# Patient Record
Sex: MALE | Race: WHITE | Employment: UNEMPLOYED | URBAN - METROPOLITAN AREA
[De-identification: names, ages, dates, MRNs, and addresses within clinical notes are randomized per-mention and may not be internally consistent; named-entity substitution may affect disease eponyms.]

---

## 2017-01-17 ENCOUNTER — OFFICE VISIT (OUTPATIENT)
Dept: PEDIATRICS | Facility: CLINIC | Age: 32
End: 2017-01-17
Payer: COMMERCIAL

## 2017-01-17 VITALS
RESPIRATION RATE: 20 BRPM | HEART RATE: 72 BPM | HEIGHT: 62 IN | SYSTOLIC BLOOD PRESSURE: 98 MMHG | TEMPERATURE: 97.3 F | DIASTOLIC BLOOD PRESSURE: 64 MMHG | WEIGHT: 108 LBS | BODY MASS INDEX: 19.88 KG/M2

## 2017-01-17 DIAGNOSIS — Q90.9 TRISOMY 21: ICD-10-CM

## 2017-01-17 DIAGNOSIS — K21.9 GASTROESOPHAGEAL REFLUX DISEASE WITHOUT ESOPHAGITIS: ICD-10-CM

## 2017-01-17 DIAGNOSIS — Z00.00 ROUTINE GENERAL MEDICAL EXAMINATION AT A HEALTH CARE FACILITY: Primary | ICD-10-CM

## 2017-01-17 DIAGNOSIS — N39.41 URGE INCONTINENCE OF URINE: ICD-10-CM

## 2017-01-17 DIAGNOSIS — K90.0 CELIAC DISEASE: ICD-10-CM

## 2017-01-17 LAB — HBA1C MFR BLD: 4.9 % (ref 4.3–6)

## 2017-01-17 PROCEDURE — 99395 PREV VISIT EST AGE 18-39: CPT | Performed by: INTERNAL MEDICINE

## 2017-01-17 PROCEDURE — 83036 HEMOGLOBIN GLYCOSYLATED A1C: CPT | Performed by: INTERNAL MEDICINE

## 2017-01-17 PROCEDURE — 36415 COLL VENOUS BLD VENIPUNCTURE: CPT | Performed by: INTERNAL MEDICINE

## 2017-01-17 NOTE — PROGRESS NOTES
SUBJECTIVE:     CC: Baljinder Denise is an 31 year old male who presents for preventative health visit.     Healthy Habits:    Do you get at least three servings of calcium containing foods daily (dairy, green leafy vegetables, etc.)? yes    Amount of exercise or daily activities, outside of work: once day(s) per week    Problems taking medications regularly not applicable    Medication side effects: Not applicable    Have you had an eye exam in the past two years? yes    Do you see a dentist twice per year? no    Do you have sleep apnea, excessive snoring or daytime drowsiness?no    Mild cough and body acne. OTC scrubs have not been effetive    Cough is fairly chronic.  Sporadic.   Unclear if related to JUSTO.    Has trisomy 21 and cognitive difficulties. Lives w/ his parents who help with his cares.    Celiac disease. No active GI sx. Following gluten-free diet.    Has occasional urinary incontinence. Mostly urge. No prior hx of DM.     Today's PHQ-2 Score: 0  PHQ-2 ( 1999 Pf[employer]) 1/17/2017 4/30/2014   Q1: Little interest or pleasure in doing things 0 0   Q2: Feeling down, depressed or hopeless 0 0   PHQ-2 Score 0 0       Abuse: Current or Past(Physical, Sexual or Emotional)- No  Do you feel safe in your environment - Yes    Social History   Substance Use Topics     Smoking status: Never Smoker      Smokeless tobacco: Never Used     Alcohol Use: No     The patient does not drink >3 drinks per day nor >7 drinks per week.    Reviewed orders with patient. Reviewed health maintenance and updated orders accordingly - Yes    All Histories reviewed and updated in Epic.    ROS:  C: NEGATIVE for fever, chills, change in weight  I: NEGATIVE for worrisome rashes, moles or lesions  E: NEGATIVE for vision changes or irritation  ENT: NEGATIVE for ear, mouth and throat problems  R: NEGATIVE for significant cough or SOB  CV: NEGATIVE for chest pain, palpitations or peripheral edema  GI: NEGATIVE for nausea, abdominal pain,  "heartburn, or change in bowel habits  : Per HPI   M: NEGATIVE for significant arthralgias or myalgia  N: NEGATIVE for weakness, dizziness or paresthesias  PSYCHIATRIC: Per HPI     Problem list, Medication list, Allergies, and Medical/Social/Surgical histories reviewed in [facility] and updated as appropriate.  Labs reviewed in EPIC  OBJECTIVE:     BP 98/64 mmHg  Pulse 72  Temp(Src) 97.3  F (36.3  C) (Axillary)  Resp 20  Ht 5' 2\" (1.575 m)  Wt 108 lb (48.988 kg)  BMI 19.75 kg/m2  EXAM:  GEN: interactive, no distress  SKIN: scattered acne lesions on the chest and upper back. Few on the face.  HEENT: PERRL. EOMI. TM's clear bilaterally. Nasal mucosa normal. OP moist without lesions.  NECK: Supple. No LAD, JVD or TM.  LUNGS: Clear to auscultation bilaterally. No rhonchi, rales, wheezes or retractions.  CV: Regular rate and rhythm.  No murmurs, rubs or gallops. Pulses 2+ radial.  ABD: Bowel sounds positive throughout. Soft, nontender, nondistended. No organomegaly. No masses.  EXTR: no LE edema  NEURO: Alert, interactive. CN 2-12 grossly intact. No focal motor deficits. Deficits w/ coordination noted.  PSYCH: Normal affect. Well groomed. Fair eye contact.     ASSESSMENT/PLAN:         ICD-10-CM    1. Routine general medical examination at a health care facility Z00.00    2. Trisomy 21 Q90.9    3. Celiac disease K90.0 Hemoglobin A1c   4. Gastroesophageal reflux disease without esophagitis K21.9    5. Urge incontinence of urine N39.41 Hemoglobin A1c         COUNSELING:  Reviewed preventive health counseling, as reflected in patient instructions         reports that he has never smoked. He has never used smokeless tobacco.    Estimated body mass index is 21.05 kg/(m^2) as calculated from the following:    Height as of 6/16/14: 5' 2.5\" (1.588 m).    Weight as of 6/16/14: 117 lb (53.071 kg).       Surya Javier MD  [facility]  "

## 2017-01-17 NOTE — PATIENT INSTRUCTIONS
For Faheem's cough, try giving Prevacid daily for the next few weeks    If his cough does improve, you can continue    Hibiclens with bathing for the next week   See if this reduces acne   If it does, you can use the Hibiclens periodically      Preventive Health Recommendations    Yearly exam:             See your health care provider every year in order to  o   Review health changes.   o   Discuss preventive care.      Shots: Get a flu shot each year. Get a tetanus shot every 10 years.     Nutrition:    Eat at least 5 servings of fruits and vegetables daily.     Eat whole-grain bread, whole-wheat pasta and brown rice instead of white grains and rice.     Get adequate Calcium and Vitamin D.     Lifestyle    Exercise for at least 150 minutes a week (30 minutes a day, 5 days a week). This will help you control your weight and prevent disease.     Limit alcohol to one drink per day.     No smoking.     Wear sunscreen to prevent skin cancer.     See your dentist every six months for an exam and cleaning.

## 2017-01-17 NOTE — MR AVS SNAPSHOT
After Visit Summary   1/17/2017    Baljinder Denise    MRN: [id number]           Patient Information     Date Of Birth          1985        Visit Information        Provider Department      1/17/2017 10:20 AM Surya Javier MD [facility]        Today's Diagnoses     Routine general medical examination at a health care facility    -  1     Trisomy 21         Celiac disease         Gastroesophageal reflux disease without esophagitis         Urge incontinence of urine           Care Instructions    For Faheem's cough, try giving Prevacid daily for the next few weeks    If his cough does improve, you can continue    Hibiclens with bathing for the next week   See if this reduces acne   If it does, you can use the Hibiclens periodically      Preventive Health Recommendations    Yearly exam:             See your health care provider every year in order to  o   Review health changes.   o   Discuss preventive care.      Shots: Get a flu shot each year. Get a tetanus shot every 10 years.     Nutrition:    Eat at least 5 servings of fruits and vegetables daily.     Eat whole-grain bread, whole-wheat pasta and brown rice instead of white grains and rice.     Get adequate Calcium and Vitamin D.     Lifestyle    Exercise for at least 150 minutes a week (30 minutes a day, 5 days a week). This will help you control your weight and prevent disease.     Limit alcohol to one drink per day.     No smoking.     Wear sunscreen to prevent skin cancer.     See your dentist every six months for an exam and cleaning.         Follow-ups after your visit        Who to contact     If you have questions or need follow up information about today's clinic visit or your schedule please contact [facility] directly at [phone].  Normal or non-critical lab and imaging results will be communicated to you by MyChart, letter or phone within 4 business days after the clinic has received the results. If you do  "not hear from us within 7 days, please contact the clinic through [employer] or phone. If you have a critical or abnormal lab result, we will notify you by phone as soon as possible.  Submit refill requests through [employer] or call your pharmacy and they will forward the refill request to us. Please allow 3 business days for your refill to be completed.          Additional Information About Your Visit        [employer] Information     [employer] gives you secure access to your electronic health record. If you see a primary care provider, you can also send messages to your care team and make appointments. If you have questions, please call your primary care clinic.  If you do not have a primary care provider, please call [phone] and they will assist you.        Care EveryWhere ID     This is your Care EveryWhere ID. This could be used by other organizations to access your [facility] medical records  [id number]        Your Vitals Were     Pulse Temperature Respirations Height BMI (Body Mass Index)       72 97.3  F (36.3  C) (Axillary) 20 5' 2\" (1.575 m) 19.75 kg/m2        Blood Pressure from Last 3 Encounters:   01/17/17 98/64   06/16/14 98/56   05/27/14 104/63    Weight from Last 3 Encounters:   01/17/17 108 lb (48.988 kg)   06/16/14 117 lb (53.071 kg)   05/27/14 119 lb (53.978 kg)              We Performed the Following     Hemoglobin A1c          Today's Medication Changes          These changes are accurate as of: 1/17/17 11:15 AM.  If you have any questions, ask your nurse or doctor.               Stop taking these medicines if you haven't already. Please contact your care team if you have questions.     PREVACID PO   Stopped by:  Surya Javier MD                    Primary Care Provider Office Phone # Fax #    Surya Javier [phone]       [facility] [address] [place] [address]        Thank you!     Thank you for choosing [facility]  for your care. Our goal is always " to provide you with excellent care. Hearing back from our patients is one way we can continue to improve our services. Please take a few minutes to complete the written survey that you may receive in the mail after your visit with us. Thank you!             Your Updated Medication List - Protect others around you: Learn how to safely use, store and throw away your medicines at www.disposemymeds.org.          This list is accurate as of: 1/17/17 11:15 AM.  Always use your most recent med list.                   Brand Name Dispense Instructions for use    UNKNOWN TO PATIENT      Anti-diarrhea

## 2017-01-17 NOTE — NURSING NOTE
"Chief Complaint   Patient presents with     Physical       Initial BP 98/64 mmHg  Pulse 72  Temp(Src) 97.3  F (36.3  C) (Axillary)  Resp 20  Ht 5' 2\" (1.575 m)  Wt 108 lb (48.988 kg)  BMI 19.75 kg/m2 Estimated body mass index is 19.75 kg/(m^2) as calculated from the following:    Height as of this encounter: 5' 2\" (1.575 m).    Weight as of this encounter: 108 lb (48.988 kg).  BP completed using cuff size: regular Rt arm  Asiya ACEVES, ANA MARÍA,AAMA      "

## 2018-02-02 ENCOUNTER — TELEPHONE (OUTPATIENT)
Dept: PEDIATRICS | Facility: CLINIC | Age: 33
End: 2018-02-02

## 2018-02-02 NOTE — TELEPHONE ENCOUNTER
Reason for Call:  Form, our goal is to have forms completed with 72 hours, however, some forms may require a visit or additional information.    Type of letter, form or note:  medical    Who is the form from?: Patient    Where did the form come from: Patient or family brought in       What clinic location was the form placed at?: [facility]    Where the form was placed: 's Box    What number is listed as a contact on the form?: [phone]       Additional comments: Please call Nanci (mom) to  [phone]    Call taken on 2/2/2018 at 10:07 AM by Dannie Borges

## 2018-04-30 ENCOUNTER — OFFICE VISIT (OUTPATIENT)
Dept: PEDIATRICS | Facility: CLINIC | Age: 33
End: 2018-04-30
Payer: COMMERCIAL

## 2018-04-30 ENCOUNTER — RADIANT APPOINTMENT (OUTPATIENT)
Dept: GENERAL RADIOLOGY | Facility: CLINIC | Age: 33
End: 2018-04-30
Attending: INTERNAL MEDICINE
Payer: COMMERCIAL

## 2018-04-30 VITALS
WEIGHT: 106.1 LBS | RESPIRATION RATE: 14 BRPM | SYSTOLIC BLOOD PRESSURE: 98 MMHG | OXYGEN SATURATION: 98 % | TEMPERATURE: 97.9 F | BODY MASS INDEX: 19.41 KG/M2 | HEART RATE: 86 BPM | DIASTOLIC BLOOD PRESSURE: 68 MMHG

## 2018-04-30 DIAGNOSIS — Q90.9 TRISOMY 21: ICD-10-CM

## 2018-04-30 DIAGNOSIS — R15.9 FULL INCONTINENCE OF FECES: Primary | ICD-10-CM

## 2018-04-30 DIAGNOSIS — Z13.220 LIPID SCREENING: ICD-10-CM

## 2018-04-30 DIAGNOSIS — R15.9 FULL INCONTINENCE OF FECES: ICD-10-CM

## 2018-04-30 DIAGNOSIS — Z13.1 SCREENING FOR DIABETES MELLITUS: ICD-10-CM

## 2018-04-30 PROCEDURE — 99213 OFFICE O/P EST LOW 20 MIN: CPT | Performed by: INTERNAL MEDICINE

## 2018-04-30 PROCEDURE — 74019 RADEX ABDOMEN 2 VIEWS: CPT

## 2018-04-30 RX ORDER — PEDIATRIC MULTIVITAMIN NO.17
TABLET,CHEWABLE ORAL
COMMUNITY

## 2018-04-30 RX ORDER — ASCORBIC ACID 250 MG
250 TABLET,CHEWABLE ORAL DAILY
COMMUNITY

## 2018-04-30 NOTE — PATIENT INSTRUCTIONS
Add a fiber supplement once per day    If this does not help, call to set up a gastroenterology visit    [facility] [phone]

## 2018-04-30 NOTE — PROGRESS NOTES
SUBJECTIVE:   Baljinder Denise is a 32 year old male who presents to clinic today for the following health issues:    Incontinence symptoms    Duration: 4 months, initially was occasionally, significant increase in the past 2 weeks     Description:  Incontinence - has been happening more often lately    Accompanying signs and symptoms (fever/nausea/vomiting/back or abdominal pain):  None    History : Hx of constipation when young, typically has a stool once per day. No toilet clogging. No blood or mucus.      Precipitating or alleviating factors: None    Therapies tried and outcome: antidiarrhea pills, more restricted diet    No infectious sx.  Is on a restricted diet - Gluten-free d/t Celiac disease, no lactose, limited nitrates.      Problem list and histories reviewed & adjusted, as indicated.    Labs reviewed in EPIC    Reviewed and updated as needed this visit by Provider  Tobacco  Allergies  Meds  Problems  Med Hx  Surg Hx  Fam Hx  Soc Hx              OBJECTIVE:     BP 98/68 (BP Location: Right arm, Patient Position: Chair, Cuff Size: Adult Regular)  Pulse 86  Temp 97.9  F (36.6  C) (Tympanic)  Resp 14  Wt 106 lb 1.6 oz (48.1 kg)  SpO2 98%  BMI 19.41 kg/m2  Body mass index is 19.41 kg/(m^2).  GEN: No distress  SKIN: No rashes  LUNGS: Clear to auscultation bilaterally. No rhonchi, rales, wheezes or retractions.  CV: Regular rate and rhythm.  No murmurs, rubs or gallops. Pulses 2+ radial.  ABD: Bowel sounds positive throughout. Soft, nontender, nondistended. No organomegaly. No masses.   EXTR: no edema    Recent Results (from the past 744 hour(s))   XR Abdomen 2 Views    Narrative    ABDOMEN TWO-THREE VIEW April 30, 2018 9:31 AM     HISTORY: Full incontinence of feces.    COMPARISON: April 30, 2014.    FINDINGS: Moderate  amount of stool. No free air. There are no air  filled distended loops of small bowel. The colon is not distended. The  lung bases are unremarkable.      Impression     IMPRESSION: Nonobstructed bowel gas pattern.    YULIET CRONIN MD          ASSESSMENT/PLAN:       ICD-10-CM    1. Full incontinence of feces R15.9 XR Abdomen 2 Views      Patient Instructions   Add a fiber supplement once per day    If this does not help, call to set up a gastroenterology visit    [facility] [phone]       Surya Javier MD  [facility]

## 2018-04-30 NOTE — MR AVS SNAPSHOT
After Visit Summary   4/30/2018    Baljinder Denise    MRN: [id number]           Patient Information     Date Of Birth          1985        Visit Information        Provider Department      4/30/2018 8:50 AM [facility]        Today's Diagnoses     Full incontinence of feces    -  1      Care Instructions    Add a fiber supplement once per day    If this does not help, call to set up a gastroenterology visit    [facility] [phone]          Follow-ups after your visit        Additional Services     GASTROENTEROLOGY ADULT REF CONSULT ONLY       Preferred Location: [facility] [phone]      Please be aware that coverage of these services is subject to the terms and limitations of your health insurance plan.  Call member services at your health plan with any benefit or coverage questions.  Any procedures must be performed at a [place] facility OR coordinated by your clinic's referral office.    Please bring the following with you to your appointment:    (1) Any X-Rays, CTs or MRIs which have been performed.  Contact the facility where they were done to arrange for  prior to your scheduled appointment.    (2) List of current medications   (3) This referral request   (4) Any documents/labs given to you for this referral                  Who to contact     If you have questions or need follow up information about today's clinic visit or your schedule please contact [facility] at [phone].  Normal or non-critical lab and imaging results will be communicated to you by MyChart, letter or phone within 4 business days after the clinic has received the results. If you do not hear from us within 7 days, please contact the clinic through MyChart or phone. If you have a critical or abnormal lab result, we will notify you by phone as soon as possible.  Submit refill requests through [employer] or call your pharmacy and they will  forward the refill request to us. Please allow 3 business days for your refill to be completed.          Additional Information About Your Visit        [employer] Information     [employer] gives you secure access to your electronic health record. If you see a primary care provider, you can also send messages to your care team and make appointments. If you have questions, please call your primary care clinic.  If you do not have a primary care provider, please call [phone] and they will assist you.        Care EveryWhere ID     This is your Care EveryWhere ID. This could be used by other organizations to access your [facility] medical records  [id number]        Your Vitals Were     Pulse Temperature Respirations Pulse Oximetry BMI (Body Mass Index)       86 97.9  F (36.6  C) (Tympanic) 14 98% 19.41 kg/m2        Blood Pressure from Last 3 Encounters:   04/30/18 98/68   01/17/17 98/64   06/16/14 98/56    Weight from Last 3 Encounters:   04/30/18 106 lb 1.6 oz (48.1 kg)   01/17/17 108 lb (49 kg)   06/16/14 117 lb (53.1 kg)              We Performed the Following     GASTROENTEROLOGY ADULT REF CONSULT ONLY        Primary Care Provider Office Phone # Fax #    Surya Javier [phone] [address] [place] [address]        Equal Access to Services     [facility]: Hadii aad ku hadasho Soomaali, waaxda luqadaha, qaybta kaalmada adeegyada, ava alex haycooper bhat . So [facility] [phone].    ATENCIÓN: Si habla español, tiene a rivera disposición servicios gratuitos de asistencia lingüística. Llame al [phone].    We comply with applicable federal civil rights laws and [place] laws. We do not discriminate on the basis of race, color, national origin, age, disability, sex, sexual orientation, or gender identity.            Thank you!     Thank you for choosing [facility]  for your care. Our goal is always to provide you with excellent care. Hearing back from  our patients is one way we can continue to improve our services. Please take a few minutes to complete the written survey that you may receive in the mail after your visit with us. Thank you!             Your Updated Medication List - Protect others around you: Learn how to safely use, store and throw away your medicines at www.disposemymeds.org.          This list is accurate as of 4/30/18  9:31 AM.  Always use your most recent med list.                   Brand Name Dispense Instructions for use Diagnosis    ALLEGRA PO      Take 30 mg by mouth        ascorbic acid 250 MG Chew chewable tablet    vitamin C     Take 250 mg by mouth daily        MULTIVITAMIN CHILDRENS Chew           UNKNOWN TO PATIENT      Anti-diarrhea

## 2018-05-22 ENCOUNTER — TRANSFERRED RECORDS (OUTPATIENT)
Dept: HEALTH INFORMATION MANAGEMENT | Facility: CLINIC | Age: 33
End: 2018-05-22

## 2018-05-23 ENCOUNTER — MEDICAL CORRESPONDENCE (OUTPATIENT)
Dept: HEALTH INFORMATION MANAGEMENT | Facility: CLINIC | Age: 33
End: 2018-05-23

## 2018-05-23 ENCOUNTER — TRANSFERRED RECORDS (OUTPATIENT)
Dept: HEALTH INFORMATION MANAGEMENT | Facility: CLINIC | Age: 33
End: 2018-05-23

## 2018-05-30 DIAGNOSIS — K59.00 CONSTIPATION: ICD-10-CM

## 2018-05-30 PROCEDURE — 74019 RADEX ABDOMEN 2 VIEWS: CPT

## 2018-06-08 ENCOUNTER — TRANSFERRED RECORDS (OUTPATIENT)
Dept: HEALTH INFORMATION MANAGEMENT | Facility: CLINIC | Age: 33
End: 2018-06-08

## 2019-01-18 ENCOUNTER — TELEPHONE (OUTPATIENT)
Dept: PEDIATRICS | Facility: CLINIC | Age: 34
End: 2019-01-18

## 2019-01-18 NOTE — TELEPHONE ENCOUNTER
Reason for Call:  Form, our goal is to have forms completed with 72 hours, however, some forms may require a visit or additional information.    Type of letter, form or note:  disability    Who is the form from?: Patient    Where did the form come from: Patient or family brought in       What clinic location was the form placed at?: [facility]    Where the form was placed: 's Box    What number is listed as a contact on the form?: [phone]       Additional comments: please mail enclosed stamped envelope     Call taken on 1/18/2019 at 4:57 PM by Ursula Dawson

## 2019-01-21 NOTE — TELEPHONE ENCOUNTER
Placed in mail. LM on VM informing Pt that papers was completed and mailed.   A copy placed in providers fax bin for future if needed.  Asiya ACEVES, ANA MARÍA,YONY

## 2020-01-10 ENCOUNTER — PRE VISIT (OUTPATIENT)
Dept: PEDIATRICS | Facility: CLINIC | Age: 35
End: 2020-01-10

## 2020-01-10 NOTE — TELEPHONE ENCOUNTER
Pre-Visit Planning     Future Appointments   Date Time Provider Department Center   1/14/2020 10:20 AM Surya Javier MD [facility]     Arrival Time for this Appointment:    Appointment Notes for this encounter:   Data Unavailable    Questionnaires Reviewed/Assigned  No additional questionnaires are needed        Patient preferred phone number: [phone]    Unable to reach patient and unable to leave voicemail.     Attempted to reach patient on home and cell phone for pre-visit planning.  The voicemail on home and cell phone did not match the patient's name, therefore a message was not left on either voicemail.  When patient arrives for appointment, requested confirmation of demographic information.

## 2020-01-13 NOTE — PATIENT INSTRUCTIONS
For loose stools, things to try:   - Increase fiber (through diet or with a supplement)   - LactAid (see if lactose intolerance may cause loose stools)    For acne:   - Try Hibiclens (chlorhexidine) wash. Use once per week for 1-2 months      Preventive Health Recommendations    Yearly exam:             See your health care provider every year in order to  o   Review health changes.   o   Discuss preventive care.      Shots: Get a flu shot each year. Get a tetanus shot every 10 years.     Nutrition:    Eat at least 5 servings of fruits and vegetables daily.     Eat whole-grain bread, whole-wheat pasta and brown rice instead of white grains and rice.     Get adequate Calcium and Vitamin D.     Lifestyle    Exercise for at least 150 minutes a week (30 minutes a day, 5 days a week). This will help you control your weight and prevent disease.     Limit alcohol to one drink per day.     No smoking.     Wear sunscreen to prevent skin cancer.     See your dentist every six months for an exam and cleaning.

## 2020-01-13 NOTE — PROGRESS NOTES
SUBJECTIVE:   CC: Baljinder Denise is an 34 year old male who presents for preventative health visit.     Healthy Habits:     Getting at least 3 servings of Calcium per day:  Yes    Bi-annual eye exam:  Yes    Dental care twice a year:  Yes    Sleep apnea or symptoms of sleep apnea:  None    Diet:  Gluten-free/reduced    Frequency of exercise:  2-3 days/week    Duration of exercise:  15-30 minutes    Taking medications regularly:  Yes    Barriers to taking medications:  None    Medication side effects:  None    PHQ-2 Total Score: 0    Additional concerns today:  No    No acute concerns today.    Has been having intermittent loose stools. Will occur one time, about 1-2 times per month.  Hx of Celiac, follows a gluten free diet.  He does not consume much dairy. Unclear if his sx could be related to dairy intake. Discussed options.    Acne. Noted mainly in the buttock area. Intermittent. Discussed options.     Today's PHQ-2 Score:   PHQ-2 ( 1999 Pfizer) 1/14/2020   Q1: Little interest or pleasure in doing things 0   Q2: Feeling down, depressed or hopeless 0   PHQ-2 Score 0   Q1: Little interest or pleasure in doing things Not at all   Q2: Feeling down, depressed or hopeless Not at all   PHQ-2 Score 0       Abuse: Current or Past(Physical, Sexual or Emotional)- No  Do you feel safe in your environment? Yes      Social History     Tobacco Use     Smoking status: Never Smoker     Smokeless tobacco: Never Used   Substance Use Topics     Alcohol use: No     If you drink alcohol do you typically have >3 drinks per day or >7 drinks per week? No    Alcohol Use 1/14/2020   Prescreen: >3 drinks/day or >7 drinks/week? Not Applicable   Prescreen: >3 drinks/day or >7 drinks/week? -       Last PSA: No results found for: PSA    Reviewed orders with patient. Reviewed health maintenance and updated orders accordingly - Yes    Reviewed and updated as needed this visit by Provider  Tobacco  Allergies  Meds  Problems  Med Hx  Surg  "Hx  Fam Hx            Review of Systems   Constitutional: Negative for chills and fever.   HENT: Negative for congestion, ear pain, hearing loss and sore throat.    Eyes: Negative for pain and visual disturbance.   Respiratory: Negative for cough and shortness of breath.    Cardiovascular: Negative for chest pain, palpitations and peripheral edema.   Gastrointestinal: Negative for abdominal pain, constipation, diarrhea, heartburn, hematochezia and nausea.   Genitourinary: Negative for discharge, dysuria, frequency, genital sores, hematuria, impotence and urgency.   Musculoskeletal: Negative for arthralgias, joint swelling and myalgias.   Skin: Negative for rash.   Neurological: Negative for dizziness, weakness, headaches and paresthesias.   Psychiatric/Behavioral: Negative for mood changes. The patient is not nervous/anxious.        OBJECTIVE:   /56 (BP Location: Right arm, Patient Position: Sitting, Cuff Size: Adult Regular)   Pulse 76   Temp 98.1  F (36.7  C) (Tympanic)   Resp 16   Ht 1.579 m (5' 2.17\")   Wt 48.1 kg (106 lb)   SpO2 99%   BMI 19.28 kg/m      Physical Exam  GENERAL: healthy, alert and no distress  EYES: Eyes grossly normal to inspection, PERRL and conjunctivae and sclerae normal  HENT: ear canals and TM's normal, nose and mouth without ulcers or lesions  NECK: no adenopathy, no asymmetry, masses, or scars and thyroid normal to palpation  RESP: lungs clear to auscultation - no rales, rhonchi or wheezes  CV: regular rate and rhythm, normal S1 S2, no S3 or S4, no murmur, click or rub, no peripheral edema and peripheral pulses strong  ABDOMEN: soft, nontender, no hepatosplenomegaly, no masses and bowel sounds normal  MS: no gross musculoskeletal defects noted, no edema  SKIN: no suspicious lesions or rashes  NEURO: Normal strength and tone, mentation intact and speech normal  PSYCH: mentation appears normal, affect normal/bright      ASSESSMENT/PLAN:       ICD-10-CM    1. Routine general " "medical examination at a health care facility Z00.00 Glucose     TSH with free T4 reflex     Lipid Profile   2. Trisomy 21 Q90.9 TSH with free T4 reflex   3. Celiac disease K90.0    4. Acne, unspecified acne type L70.9        COUNSELING:   Reviewed preventive health counseling, as reflected in patient instructions    Estimated body mass index is 19.28 kg/m  as calculated from the following:    Height as of this encounter: 1.579 m (5' 2.17\").    Weight as of this encounter: 48.1 kg (106 lb).          reports that he has never smoked. He has never used smokeless tobacco.      Counseling Resources:  ATP IV Guidelines  Pooled Cohorts Equation Calculator  FRAX Risk Assessment  ICSI Preventive Guidelines  Dietary Guidelines for Americans, 2010  USDA's MyPlate  ASA Prophylaxis  Lung CA Screening    Surya Javier MD  [facility] [place]  "

## 2020-01-14 ENCOUNTER — OFFICE VISIT (OUTPATIENT)
Dept: PEDIATRICS | Facility: CLINIC | Age: 35
End: 2020-01-14
Payer: COMMERCIAL

## 2020-01-14 VITALS
WEIGHT: 106 LBS | SYSTOLIC BLOOD PRESSURE: 100 MMHG | RESPIRATION RATE: 16 BRPM | TEMPERATURE: 98.1 F | DIASTOLIC BLOOD PRESSURE: 56 MMHG | OXYGEN SATURATION: 99 % | HEART RATE: 76 BPM | BODY MASS INDEX: 19.51 KG/M2 | HEIGHT: 62 IN

## 2020-01-14 DIAGNOSIS — Q90.9 TRISOMY 21: ICD-10-CM

## 2020-01-14 DIAGNOSIS — K90.0 CELIAC DISEASE: ICD-10-CM

## 2020-01-14 DIAGNOSIS — Z00.00 ROUTINE GENERAL MEDICAL EXAMINATION AT A HEALTH CARE FACILITY: Primary | ICD-10-CM

## 2020-01-14 DIAGNOSIS — L70.9 ACNE, UNSPECIFIED ACNE TYPE: ICD-10-CM

## 2020-01-14 PROCEDURE — 99395 PREV VISIT EST AGE 18-39: CPT | Performed by: INTERNAL MEDICINE

## 2020-01-14 ASSESSMENT — ENCOUNTER SYMPTOMS
FEVER: 0
HEARTBURN: 0
DIARRHEA: 0
SHORTNESS OF BREATH: 0
HEADACHES: 0
COUGH: 0
JOINT SWELLING: 0
CONSTIPATION: 0
EYE PAIN: 0
DIZZINESS: 0
DYSURIA: 0
SORE THROAT: 0
HEMATURIA: 0
NAUSEA: 0
PALPITATIONS: 0
HEMATOCHEZIA: 0
ARTHRALGIAS: 0
FREQUENCY: 0
MYALGIAS: 0
CHILLS: 0
NERVOUS/ANXIOUS: 0
PARESTHESIAS: 0
ABDOMINAL PAIN: 0
WEAKNESS: 0

## 2020-01-14 ASSESSMENT — MIFFLIN-ST. JEOR: SCORE: 1312.68

## 2020-03-01 ENCOUNTER — HEALTH MAINTENANCE LETTER (OUTPATIENT)
Age: 35
End: 2020-03-01

## 2020-10-25 ENCOUNTER — OFFICE VISIT (OUTPATIENT)
Dept: URGENT CARE | Facility: URGENT CARE | Age: 35
End: 2020-10-25
Payer: COMMERCIAL

## 2020-10-25 VITALS
HEART RATE: 67 BPM | OXYGEN SATURATION: 96 % | DIASTOLIC BLOOD PRESSURE: 78 MMHG | SYSTOLIC BLOOD PRESSURE: 102 MMHG | TEMPERATURE: 96.8 F

## 2020-10-25 DIAGNOSIS — L03.221 CELLULITIS OF NECK: ICD-10-CM

## 2020-10-25 DIAGNOSIS — L40.9 SCALP PSORIASIS: Primary | ICD-10-CM

## 2020-10-25 PROCEDURE — 99214 OFFICE O/P EST MOD 30 MIN: CPT | Performed by: FAMILY MEDICINE

## 2020-10-25 RX ORDER — CEPHALEXIN 500 MG/1
500 CAPSULE ORAL 3 TIMES DAILY
Qty: 21 CAPSULE | Refills: 0 | Status: SHIPPED | OUTPATIENT
Start: 2020-10-25 | End: 2020-11-01

## 2020-10-25 RX ORDER — CLOBETASOL PROPIONATE 0.5 MG/G
OINTMENT TOPICAL 2 TIMES DAILY
Qty: 120 G | Refills: 4 | Status: SHIPPED | OUTPATIENT
Start: 2020-10-25 | End: 2022-12-14

## 2020-10-25 NOTE — PROGRESS NOTES
SUBJECTIVE:   Baljinder Denise is a 35 year old male who is here with his mother.  He is presenting with a chief complaint of a severe flare-up of scalp psoriasis (diagnosed by his mother) with scaling, dryness, redness, itching at the posterior and superior areas of the scalp for the past 3-4 weeks.  .  In addition, patient has also noticed a new rash on the posterior, lateral and anterio neck  since two days ago.  (This new rash has been itchy). His older sister is concerned about a possible skin infection due to the patient's frequent scratching of the neck and scalp.       There has been bleeding and cracks on the scalp since a few days ago.      Patient has had dandruff shampoos placed and Nizoral shampoo over the scalp without any improvement.  In addition, coconut oil has been applied.    .       No new shampoos/clothes/detergents.      Past Medical History:   Diagnosis Date     Gastro-oesophageal reflux disease      Pneumonia 11/89    Hospitalized   Scalp Psoriasis  Celiac Disease  Trisomy 21    Current Outpatient Medications   Medication Sig Dispense Refill     ascorbic acid (VITAMIN C) 250 MG CHEW chewable tablet Take 250 mg by mouth daily       Fexofenadine HCl (ALLEGRA PO) Take 30 mg by mouth       Pediatric Multiple Vit-C-FA (MULTIVITAMIN CHILDRENS) CHEW        UNKNOWN TO PATIENT Anti-diarrhea       Social History     Tobacco Use     Smoking status: Never Smoker     Smokeless tobacco: Never Used   Substance Use Topics     Alcohol use: No       ROS:  CONSTITUTIONAL:NEGATIVE  for fevers.   INTEGUMENTARY/SKIN:  Positive for scalp psoriasis.      OBJECTIVE:  /78   Pulse 67   Temp 96.8  F (36  C) (Tympanic)   SpO2 96%   GENERAL APPEARANCE: healthy, alert and no distress  SKIN: posterior and posterior-superior aspects of the scalp have confluent erythema, extensive white hyperkeratotic scaling with excoriations.  The right posterior-lateral neck has skin excoriations, confluent erythema, edema.       ASSESSMENT:  Scalp Psoriasis  Cellulitis of the neck.       PLAN:  For the Scalp Psoriasis: Rx:  Clobetasol 0.05% ointment BID.  Do not exceed 14 days of use.    Patient has a scheduled appointment with his primary care provider in 2 weeks.      For the cellulitis of the neck:  Rx:  Cephalexin.  Go to the emergency room if fevers/increased pain appears.     Edmund Moreno MD

## 2020-11-02 ENCOUNTER — OFFICE VISIT (OUTPATIENT)
Dept: PEDIATRICS | Facility: CLINIC | Age: 35
End: 2020-11-02
Payer: COMMERCIAL

## 2020-11-02 VITALS
RESPIRATION RATE: 16 BRPM | DIASTOLIC BLOOD PRESSURE: 60 MMHG | HEART RATE: 85 BPM | OXYGEN SATURATION: 98 % | WEIGHT: 101 LBS | BODY MASS INDEX: 18.38 KG/M2 | SYSTOLIC BLOOD PRESSURE: 100 MMHG | TEMPERATURE: 97.2 F

## 2020-11-02 DIAGNOSIS — Z23 NEED FOR PROPHYLACTIC VACCINATION AND INOCULATION AGAINST INFLUENZA: ICD-10-CM

## 2020-11-02 DIAGNOSIS — L40.9 PSORIASIS: Primary | ICD-10-CM

## 2020-11-02 PROCEDURE — 90686 IIV4 VACC NO PRSV 0.5 ML IM: CPT | Performed by: INTERNAL MEDICINE

## 2020-11-02 PROCEDURE — 90471 IMMUNIZATION ADMIN: CPT | Performed by: INTERNAL MEDICINE

## 2020-11-02 PROCEDURE — 99213 OFFICE O/P EST LOW 20 MIN: CPT | Mod: 25 | Performed by: INTERNAL MEDICINE

## 2020-11-02 NOTE — PROGRESS NOTES
"Subjective     Baljinder Denise is a 35 year old male who presents to clinic today for the following health issues:    History of Present Illness       He eats 4 or more servings of fruits and vegetables daily.He consumes 0 sweetened beverage(s) daily.   He is taking medications regularly.           Rash  Onset/Duration: ***  Description  Location: ***  Character: {RASH DESCRIPT:462538}  Itching: {MILD MOD:282685::\"no\"}  Intensity:  {MILD/MODERATE:796935::\"moderate\"}  Progression of Symptoms:  {.:212651}  Accompanying signs and symptoms:   Fever: {.:857434::\"no\"}  Body aches or joint pain: {.:492766::\"no\"}  Sore throat symptoms: {.:954630::\"no\"}  Recent cold symptoms: {.:129194::\"no\"}  History:           Previous episodes of similar rash: {NONE DEFAULTED:877953::\"None\"}  New exposures:  {ALLERGENS:015629::\"None\"}  Recent travel: {.:674779::\"no\"}  Exposure to similar rash: {.:224466::\"no\"}  Precipitating or alleviating factors: ***  Therapies tried and outcome: {MEDICATIONS FOR RASH:390749::\"none\"}    {additonal problems for provider to add (Optional):940526}    Review of Systems   {ROS COMP (Optional):978033}      Objective    There were no vitals taken for this visit.  There is no height or weight on file to calculate BMI.  Physical Exam   {Exam List (Optional):180006}    {Diagnostic Test Results (Optional):981591}        {PROVIDER CHARTING PREFERENCE:476640}    "

## 2020-11-02 NOTE — PROGRESS NOTES
Subjective     Baljinder Denise is a 35 year old male who presents to clinic today for the following health issues:    History of Present Illness       He eats 4 or more servings of fruits and vegetables daily.He consumes 0 sweetened beverage(s) daily.   He is taking medications regularly.            Followup:    Facility:  [facility]  Date of visit: 10/25/20  Reason for visit: psoriasis  Current Status: Improved     Had possible infection along w/ new dx of psoriasis.   Occipital scalp is the most affected area.  Had erythema on the neck and temporal regions.  Treated w/ clobeasol ointment plus cephalexin.  Overall sx are markedly improved. Still w/ some pink and pruritic skin on the lower occiput. Using steroid ointment only prn at this time.          Objective    /60   Pulse 85   Temp 97.2  F (36.2  C) (Tympanic)   Resp 16   Wt 45.8 kg (101 lb)   SpO2 98%   BMI 18.38 kg/m    Body mass index is 18.38 kg/m .  Physical Exam   GEN: no distress  SKIN: patch of pink skin w/ mild hyperkeratosis right lower occipital scalp. No excoriations or scabbing. No temporal region lesions noted.          Assessment & Plan       ICD-10-CM    1. Psoriasis  L40.9    2. Need for prophylactic vaccination and inoculation against influenza  Z23 INFLUENZA VACCINE IM > 6 MONTHS VALENT IIV4 [58443]     New clinical dx of psoriasis.  Discussed management. Restart  BID steroid ointment, use for at least 1 more week BID then 1 week daily. Restart if sx return.           Surya Javier MD  [facility]

## 2020-12-14 ENCOUNTER — TELEPHONE (OUTPATIENT)
Dept: PEDIATRICS | Facility: CLINIC | Age: 35
End: 2020-12-14

## 2020-12-14 NOTE — TELEPHONE ENCOUNTER
Forms/Letter Request    Name of form/letter: medical history form for horse back riding.    Have you been seen for this request: No    Do we have the form/letter: Yes: Dr Javier    When is form/letter needed by: as soon as done    How would you like the form/letter returned: Mail    Patient Notified form requests are processed in 3-5 business days:Yes    Okay to leave a detailed message? Yes Cell number on file:    Telephone Information:   Mobile [phone]

## 2021-02-27 ENCOUNTER — HEALTH MAINTENANCE LETTER (OUTPATIENT)
Age: 36
End: 2021-02-27

## 2021-03-17 ENCOUNTER — IMMUNIZATION (OUTPATIENT)
Dept: NURSING | Facility: CLINIC | Age: 36
End: 2021-03-17
Payer: COMMERCIAL

## 2021-03-17 PROCEDURE — 0001A PR COVID VAC PFIZER DIL RECON 30 MCG/0.3 ML IM: CPT

## 2021-03-17 PROCEDURE — 91300 PR COVID VAC PFIZER DIL RECON 30 MCG/0.3 ML IM: CPT

## 2021-04-07 ENCOUNTER — IMMUNIZATION (OUTPATIENT)
Dept: NURSING | Facility: CLINIC | Age: 36
End: 2021-04-07
Attending: INTERNAL MEDICINE
Payer: COMMERCIAL

## 2021-04-07 PROCEDURE — 0002A PR COVID VAC PFIZER DIL RECON 30 MCG/0.3 ML IM: CPT

## 2021-04-07 PROCEDURE — 91300 PR COVID VAC PFIZER DIL RECON 30 MCG/0.3 ML IM: CPT

## 2021-05-12 ENCOUNTER — OFFICE VISIT (OUTPATIENT)
Dept: PEDIATRICS | Facility: CLINIC | Age: 36
End: 2021-05-12
Payer: COMMERCIAL

## 2021-05-12 VITALS
HEART RATE: 57 BPM | DIASTOLIC BLOOD PRESSURE: 70 MMHG | OXYGEN SATURATION: 100 % | BODY MASS INDEX: 18.5 KG/M2 | WEIGHT: 101.7 LBS | SYSTOLIC BLOOD PRESSURE: 102 MMHG | TEMPERATURE: 97.8 F

## 2021-05-12 DIAGNOSIS — Q90.9 TRISOMY 21: ICD-10-CM

## 2021-05-12 DIAGNOSIS — Z00.00 ROUTINE GENERAL MEDICAL EXAMINATION AT A HEALTH CARE FACILITY: Primary | ICD-10-CM

## 2021-05-12 DIAGNOSIS — K90.0 CELIAC DISEASE: ICD-10-CM

## 2021-05-12 DIAGNOSIS — L40.9 PSORIASIS: ICD-10-CM

## 2021-05-12 LAB
ANION GAP SERPL CALCULATED.3IONS-SCNC: 2 MMOL/L (ref 3–14)
BUN SERPL-MCNC: 15 MG/DL (ref 7–30)
CALCIUM SERPL-MCNC: 8.7 MG/DL (ref 8.5–10.1)
CHLORIDE SERPL-SCNC: 106 MMOL/L (ref 94–109)
CHOLEST SERPL-MCNC: 135 MG/DL
CO2 SERPL-SCNC: 30 MMOL/L (ref 20–32)
CREAT SERPL-MCNC: 0.83 MG/DL (ref 0.66–1.25)
GFR SERPL CREATININE-BSD FRML MDRD: >90 ML/MIN/{1.73_M2}
GLUCOSE SERPL-MCNC: 80 MG/DL (ref 70–99)
HDLC SERPL-MCNC: 41 MG/DL
LDLC SERPL CALC-MCNC: 76 MG/DL
NONHDLC SERPL-MCNC: 94 MG/DL
POTASSIUM SERPL-SCNC: 4.2 MMOL/L (ref 3.4–5.3)
SODIUM SERPL-SCNC: 139 MMOL/L (ref 133–144)
TRIGL SERPL-MCNC: 89 MG/DL
TSH SERPL DL<=0.005 MIU/L-ACNC: 1.1 MU/L (ref 0.4–4)

## 2021-05-12 PROCEDURE — 36415 COLL VENOUS BLD VENIPUNCTURE: CPT | Performed by: INTERNAL MEDICINE

## 2021-05-12 PROCEDURE — 80061 LIPID PANEL: CPT | Performed by: INTERNAL MEDICINE

## 2021-05-12 PROCEDURE — 99395 PREV VISIT EST AGE 18-39: CPT | Performed by: INTERNAL MEDICINE

## 2021-05-12 PROCEDURE — 80048 BASIC METABOLIC PNL TOTAL CA: CPT | Performed by: INTERNAL MEDICINE

## 2021-05-12 PROCEDURE — 84443 ASSAY THYROID STIM HORMONE: CPT | Performed by: INTERNAL MEDICINE

## 2021-05-12 ASSESSMENT — ENCOUNTER SYMPTOMS
HEADACHES: 0
SORE THROAT: 0
FEVER: 0
ABDOMINAL PAIN: 0
CHILLS: 0
HEARTBURN: 0
WEAKNESS: 0
FREQUENCY: 0
CONSTIPATION: 0
DIARRHEA: 0
HEMATOCHEZIA: 0
PALPITATIONS: 0
MYALGIAS: 0
NAUSEA: 0
SHORTNESS OF BREATH: 0
DIZZINESS: 0
HEMATURIA: 0
EYE PAIN: 0
DYSURIA: 0
ARTHRALGIAS: 0
PARESTHESIAS: 0
JOINT SWELLING: 0
COUGH: 0
NERVOUS/ANXIOUS: 0

## 2021-05-12 NOTE — PROGRESS NOTES
SUBJECTIVE:   CC: Baljinder Denise is an 35 year old male who presents for preventative health visit.     {Split Bill scripting  The purpose of this visit is to discuss your medical history and prevent health problems before you are sick. You may be responsible for a co-pay, coinsurance, or deductible if your visit today includes services such as checking on a sore throat, having an x-ray or lab test, or treating and evaluating a new or existing condition :644651}  Patient has been advised of split billing requirements and indicates understanding: {Yes and No:040902}  Healthy Habits:     Getting at least 3 servings of Calcium per day:  Yes    Bi-annual eye exam:  Yes    Dental care twice a year:  Yes    Sleep apnea or symptoms of sleep apnea:  None    Diet:  Gluten-free/reduced    Frequency of exercise:  2-3 days/week    Duration of exercise:  15-30 minutes    Taking medications regularly:  Yes    Medication side effects:  None    PHQ-2 Total Score: 0    Additional concerns today:  No    {Add if <65 person on Medicare  - Required Questions (Optional):039282}  {Outside tests to abstract? :977169}    {additional problems to add (Optional):007378}    Today's PHQ-2 Score:   PHQ-2 ( 1999 Pfizer) 5/12/2021   Q1: Little interest or pleasure in doing things 0   Q2: Feeling down, depressed or hopeless 0   PHQ-2 Score 0   Q1: Little interest or pleasure in doing things Not at all   Q2: Feeling down, depressed or hopeless Not at all   PHQ-2 Score 0       Abuse: Current or Past(Physical, Sexual or Emotional)- { :119780}  Do you feel safe in your environment? { :585201}    Have you ever done Advance Care Planning? (For example, a Health Directive, POLST, or a discussion with a medical provider or your loved ones about your wishes): { :152628}    Social History     Tobacco Use     Smoking status: Never Smoker     Smokeless tobacco: Never Used   Substance Use Topics     Alcohol use: No     {Rooming Staff- Complete this  "question if Prescreen response is not shown below for today's visit. If you drink alcohol do you typically have >3 drinks per day or >7 drinks per week? (Optional):120264}    Alcohol Use 5/12/2021   Prescreen: >3 drinks/day or >7 drinks/week? Not Applicable   Prescreen: >3 drinks/day or >7 drinks/week? -   {add AUDIT responses (Optional) (A score of 7 for adult men is an indication of hazardous drinking; a score of 8 or more is an indication of an alcohol use disorder.  A score of 7 or more for adult women is an indication of hazardous drinking or an alchohol use disorder):669279}    Last PSA: No results found for: PSA    Reviewed orders with patient. Reviewed health maintenance and updated orders accordingly - { :224325::\"Yes\"}  {Chronicprobdata (optional):745724}    Reviewed and updated as needed this visit by clinical staff  Tobacco  Allergies    Med Hx            Reviewed and updated as needed this visit by Provider                {HISTORY OPTIONS (Optional):477920}    Review of Systems   Constitutional: Negative for chills and fever.   HENT: Negative for congestion, ear pain, hearing loss and sore throat.    Eyes: Negative for pain and visual disturbance.   Respiratory: Negative for cough and shortness of breath.    Cardiovascular: Negative for chest pain, palpitations and peripheral edema.   Gastrointestinal: Negative for abdominal pain, constipation, diarrhea, heartburn, hematochezia and nausea.   Genitourinary: Negative for discharge, dysuria, frequency, genital sores, hematuria, impotence and urgency.   Musculoskeletal: Negative for arthralgias, joint swelling and myalgias.   Skin: Negative for rash.   Neurological: Negative for dizziness, weakness, headaches and paresthesias.   Psychiatric/Behavioral: Negative for mood changes. The patient is not nervous/anxious.      {MALE ROS (Optional):968603::\"CONSTITUTIONAL: NEGATIVE for fever, chills, change in weight\",\"INTEGUMENTARY/SKIN: NEGATIVE for worrisome " "rashes, moles or lesions\",\"EYES: NEGATIVE for vision changes or irritation\",\"ENT: NEGATIVE for ear, mouth and throat problems\",\"RESP: NEGATIVE for significant cough or SOB\",\"CV: NEGATIVE for chest pain, palpitations or peripheral edema\",\"GI: NEGATIVE for nausea, abdominal pain, heartburn, or change in bowel habits\",\" male: negative for dysuria, hematuria, decreased urinary stream, erectile dysfunction, urethral discharge\",\"MUSCULOSKELETAL: NEGATIVE for significant arthralgias or myalgia\",\"NEURO: NEGATIVE for weakness, dizziness or paresthesias\",\"PSYCHIATRIC: NEGATIVE for changes in mood or affect\"}    OBJECTIVE:   There were no vitals taken for this visit.    Physical Exam  {Exam Choices (Optional):145621}    {Diagnostic Test Results (Optional):146828::\"Diagnostic Test Results:\",\"Labs reviewed in Epic\"}    ASSESSMENT/PLAN:   {Diag Picklist:551187}    Patient has been advised of split billing requirements and indicates understanding: {YES / NO:164235::\"Yes\"}  COUNSELING:   {MALE COUNSELING MESSAGES:584041::\"Reviewed preventive health counseling, as reflected in patient instructions\"}    Estimated body mass index is 18.38 kg/m  as calculated from the following:    Height as of 1/14/20: 1.579 m (5' 2.17\").    Weight as of 11/2/20: 45.8 kg (101 lb).     {Weight Management Plan (ACO) Complete if BMI is abnormal-  Ages 18-64  BMI >24.9.  Age 65+ with BMI <23 or >30 (Optional):780377}    He reports that he has never smoked. He has never used smokeless tobacco.      Counseling Resources:  ATP IV Guidelines  Pooled Cohorts Equation Calculator  FRAX Risk Assessment  ICSI Preventive Guidelines  Dietary Guidelines for Americans, 2010  USDA's MyPlate  ASA Prophylaxis  Lung CA Screening    Surya Javier MD  [facility] [place]  "

## 2021-05-12 NOTE — PATIENT INSTRUCTIONS
Preventive Health Recommendations    Yearly exam:             See your health care provider every year in order to  o   Review health changes.   o   Discuss preventive care.    o   Review your medicines.    Shots: Get a flu shot each year. Get a tetanus shot every 10 years.     Nutrition:    Eat at least 5 servings of fruits and vegetables daily.     Eat whole-grain bread, whole-wheat pasta and brown rice instead of white grains and rice.     Get adequate Calcium and Vitamin D.     Lifestyle    Exercise for at least 150 minutes a week (30 minutes a day, 5 days a week). This will help you control your weight and prevent disease.     No smoking.     Wear sunscreen to prevent skin cancer.     See your dentist every six months for an exam and cleaning.

## 2021-05-12 NOTE — PROGRESS NOTES
SUBJECTIVE:   Baljinder Denise is a 35 year old male who presents for Preventive Visit.      Patient has been advised of split billing requirements and indicates understanding: Yes   Are you in the first 12 months of your Medicare coverage?  No    Healthy Habits:     Getting at least 3 servings of Calcium per day:  Yes    Bi-annual eye exam:  Yes    Dental care twice a year:  Yes    Sleep apnea or symptoms of sleep apnea:  None    Diet:  Gluten-free/reduced    Frequency of exercise:  2-3 days/week    Duration of exercise:  15-30 minutes    Taking medications regularly:  Yes    Medication side effects:  None    PHQ-2 Total Score: 0    Additional concerns today:  No    Here today w/ his father.  Overall has been feeling well.  Trisomy 21.  Hx of Celiac disease. Following gluten-free diet. No GI sx noted.  Psoriasis. Cleared w/ topical steroids. Does not need at this time.    Unfortunately his mother underwent cardiac evaluation and became unresponsive. She is still in the ICU.    Do you feel safe in your environment? Yes    Have you ever done Advance Care Planning? (For example, a Health Directive, POLST, or a discussion with a medical provider or your loved ones about your wishes): Yes, advance care planning is on file.     Fall risk  Fallen 2 or more times in the past year?: No  Any fall with injury in the past year?: No  click delete button to remove this line now  Cognitive Screening Not appropriate due to mental handicap    Do you have sleep apnea, excessive snoring or daytime drowsiness?: no    Social History     Tobacco Use     Smoking status: Never Smoker     Smokeless tobacco: Never Used   Substance Use Topics     Alcohol use: No     If you drink alcohol do you typically have >3 drinks per day or >7 drinks per week? No    Alcohol Use 5/12/2021   Prescreen: >3 drinks/day or >7 drinks/week? Not Applicable   Prescreen: >3 drinks/day or >7 drinks/week? -       Current providers sharing in care for this patient  "include:   Patient Care Team:  Surya Javier MD as PCP - General (Internal Medicine)  Surya Javier MD as Assigned PCP    The following health maintenance items are reviewed in Epic and correct as of today:  Health Maintenance Due   Topic Date Due     ANNUAL REVIEW OF HM ORDERS  Never done     ADVANCE CARE PLANNING  Never done     HIV SCREENING  Never done     HEPATITIS C SCREENING  Never done     DTAP/TDAP/TD IMMUNIZATION (7 - Td) 09/17/2017     PREVENTIVE CARE VISIT  01/14/2021       Review of Systems   Constitutional: Negative for chills and fever.   HENT: Negative for congestion, ear pain, hearing loss and sore throat.    Eyes: Negative for pain and visual disturbance.   Respiratory: Negative for cough and shortness of breath.    Cardiovascular: Negative for chest pain, palpitations and peripheral edema.   Gastrointestinal: Negative for abdominal pain, constipation, diarrhea, heartburn, hematochezia and nausea.   Genitourinary: Negative for discharge, dysuria, frequency, genital sores, hematuria, impotence and urgency.   Musculoskeletal: Negative for arthralgias, joint swelling and myalgias.   Skin: Negative for rash.   Neurological: Negative for dizziness, weakness, headaches and paresthesias.   Psychiatric/Behavioral: Negative for mood changes. The patient is not nervous/anxious.        OBJECTIVE:   /70 (BP Location: Right arm, Cuff Size: Child)   Pulse 57   Temp 97.8  F (36.6  C) (Tympanic)   Wt 46.1 kg (101 lb 11.2 oz)   SpO2 100%   BMI 18.50 kg/m   Estimated body mass index is 18.5 kg/m  as calculated from the following:    Height as of 1/14/20: 1.579 m (5' 2.17\").    Weight as of this encounter: 46.1 kg (101 lb 11.2 oz).  Physical Exam  GEN: no distress  SKIN: no rashes noted  HEENT: PERRL. EOMI. TM's clear kenzie.  NECK: Supple. No LAD or TM.  LUNGS: Clear to auscultation bilaterally. No rhonchi, rales, wheezes or retractions.  CV: Regular rate and rhythm.  No murmurs, rubs or gallops. Pulses " "2+ radial.  ABD: Bowel sounds positive throughout. Soft, nontender, nondistended. No organomegaly. No masses.  EXTR: no edema  NEURO: no focal motor deficits         ASSESSMENT / PLAN:       ICD-10-CM    1. Routine general medical examination at a health care facility  Z00.00 Lipid Profile (Chol, Trig, HDL, LDL calc)     TSH with free T4 reflex     Basic metabolic panel  (Ca, Cl, CO2, Creat, Gluc, K, Na, BUN)     CANCELED: Glucose   2. Celiac disease  K90.0    3. Trisomy 21  Q90.9    4. Psoriasis  L40.9          COUNSELING:  Reviewed preventive health counseling, as reflected in patient instructions    Estimated body mass index is 18.5 kg/m  as calculated from the following:    Height as of 1/14/20: 1.579 m (5' 2.17\").    Weight as of this encounter: 46.1 kg (101 lb 11.2 oz).    Weight management plan noted, stable and monitoring    He reports that he has never smoked. He has never used smokeless tobacco.      Appropriate preventive services were discussed with this patient, including applicable screening as appropriate for cardiovascular disease, diabetes, osteopenia/osteoporosis, and glaucoma. Checklist reviewing preventive services available has been given to the patient.    Reviewed patients plan of care and provided an AVS. The Basic Care Plan (routine screening as documented in Health Maintenance) for Baljinder meets the Care Plan requirement. This Care Plan has been established and reviewed with the Patient and his father.    Counseling Resources:  ATP IV Guidelines  Pooled Cohorts Equation Calculator  Breast Cancer Risk Calculator  Breast Cancer: Medication to Reduce Risk  FRAX Risk Assessment  ICSI Preventive Guidelines  Dietary Guidelines for Americans, 2010  USDA's MyPlate  ASA Prophylaxis  Lung CA Screening    Surya Javier MD  [facility]    Identified Health Risks:  "

## 2021-10-02 ENCOUNTER — HEALTH MAINTENANCE LETTER (OUTPATIENT)
Age: 36
End: 2021-10-02

## 2022-07-03 ENCOUNTER — HEALTH MAINTENANCE LETTER (OUTPATIENT)
Age: 37
End: 2022-07-03

## 2022-12-14 ENCOUNTER — OFFICE VISIT (OUTPATIENT)
Dept: PEDIATRICS | Facility: CLINIC | Age: 37
End: 2022-12-14
Payer: COMMERCIAL

## 2022-12-14 VITALS
TEMPERATURE: 98.1 F | HEART RATE: 87 BPM | WEIGHT: 104.5 LBS | DIASTOLIC BLOOD PRESSURE: 64 MMHG | BODY MASS INDEX: 19.23 KG/M2 | SYSTOLIC BLOOD PRESSURE: 102 MMHG | OXYGEN SATURATION: 100 % | RESPIRATION RATE: 16 BRPM | HEIGHT: 62 IN

## 2022-12-14 DIAGNOSIS — K90.0 CELIAC DISEASE: ICD-10-CM

## 2022-12-14 DIAGNOSIS — Q90.9 TRISOMY 21: ICD-10-CM

## 2022-12-14 DIAGNOSIS — Z00.00 ROUTINE GENERAL MEDICAL EXAMINATION AT A HEALTH CARE FACILITY: Primary | ICD-10-CM

## 2022-12-14 PROCEDURE — 90715 TDAP VACCINE 7 YRS/> IM: CPT | Performed by: INTERNAL MEDICINE

## 2022-12-14 PROCEDURE — 99395 PREV VISIT EST AGE 18-39: CPT | Mod: 25 | Performed by: INTERNAL MEDICINE

## 2022-12-14 PROCEDURE — 90471 IMMUNIZATION ADMIN: CPT | Performed by: INTERNAL MEDICINE

## 2022-12-14 SDOH — ECONOMIC STABILITY: FOOD INSECURITY: WITHIN THE PAST 12 MONTHS, YOU WORRIED THAT YOUR FOOD WOULD RUN OUT BEFORE YOU GOT MONEY TO BUY MORE.: NEVER TRUE

## 2022-12-14 SDOH — ECONOMIC STABILITY: FOOD INSECURITY: WITHIN THE PAST 12 MONTHS, THE FOOD YOU BOUGHT JUST DIDN'T LAST AND YOU DIDN'T HAVE MONEY TO GET MORE.: NEVER TRUE

## 2022-12-14 SDOH — HEALTH STABILITY: PHYSICAL HEALTH: ON AVERAGE, HOW MANY MINUTES DO YOU ENGAGE IN EXERCISE AT THIS LEVEL?: 20 MIN

## 2022-12-14 SDOH — ECONOMIC STABILITY: INCOME INSECURITY: HOW HARD IS IT FOR YOU TO PAY FOR THE VERY BASICS LIKE FOOD, HOUSING, MEDICAL CARE, AND HEATING?: NOT HARD AT ALL

## 2022-12-14 SDOH — ECONOMIC STABILITY: TRANSPORTATION INSECURITY
IN THE PAST 12 MONTHS, HAS THE LACK OF TRANSPORTATION KEPT YOU FROM MEDICAL APPOINTMENTS OR FROM GETTING MEDICATIONS?: NO

## 2022-12-14 SDOH — HEALTH STABILITY: PHYSICAL HEALTH: ON AVERAGE, HOW MANY DAYS PER WEEK DO YOU ENGAGE IN MODERATE TO STRENUOUS EXERCISE (LIKE A BRISK WALK)?: 4 DAYS

## 2022-12-14 SDOH — ECONOMIC STABILITY: TRANSPORTATION INSECURITY
IN THE PAST 12 MONTHS, HAS LACK OF TRANSPORTATION KEPT YOU FROM MEETINGS, WORK, OR FROM GETTING THINGS NEEDED FOR DAILY LIVING?: NO

## 2022-12-14 SDOH — ECONOMIC STABILITY: INCOME INSECURITY: IN THE LAST 12 MONTHS, WAS THERE A TIME WHEN YOU WERE NOT ABLE TO PAY THE MORTGAGE OR RENT ON TIME?: NO

## 2022-12-14 ASSESSMENT — SOCIAL DETERMINANTS OF HEALTH (SDOH)
ARE YOU MARRIED, WIDOWED, DIVORCED, SEPARATED, NEVER MARRIED, OR LIVING WITH A PARTNER?: NEVER MARRIED
HOW OFTEN DO YOU GET TOGETHER WITH FRIENDS OR RELATIVES?: ONCE A WEEK
HOW OFTEN DO YOU ATTEND CHURCH OR RELIGIOUS SERVICES?: MORE THAN 4 TIMES PER YEAR
DO YOU BELONG TO ANY CLUBS OR ORGANIZATIONS SUCH AS CHURCH GROUPS UNIONS, FRATERNAL OR ATHLETIC GROUPS, OR SCHOOL GROUPS?: YES

## 2022-12-14 ASSESSMENT — ENCOUNTER SYMPTOMS
CONSTIPATION: 0
DYSURIA: 0
NERVOUS/ANXIOUS: 0
HEMATURIA: 0
FEVER: 0
PARESTHESIAS: 0
HEADACHES: 0
SHORTNESS OF BREATH: 0
FREQUENCY: 0
MYALGIAS: 0
SORE THROAT: 0
DIZZINESS: 0
JOINT SWELLING: 0
NAUSEA: 0
ARTHRALGIAS: 0
CHILLS: 0
DIARRHEA: 0
COUGH: 0
HEARTBURN: 0
WEAKNESS: 0
HEMATOCHEZIA: 0
ABDOMINAL PAIN: 0
PALPITATIONS: 0

## 2022-12-14 ASSESSMENT — LIFESTYLE VARIABLES
HOW MANY STANDARD DRINKS CONTAINING ALCOHOL DO YOU HAVE ON A TYPICAL DAY: PATIENT DOES NOT DRINK
AUDIT-C TOTAL SCORE: 0
SKIP TO QUESTIONS 9-10: 1
HOW OFTEN DO YOU HAVE A DRINK CONTAINING ALCOHOL: NEVER
HOW OFTEN DO YOU HAVE SIX OR MORE DRINKS ON ONE OCCASION: NEVER

## 2022-12-14 ASSESSMENT — PAIN SCALES - GENERAL: PAINLEVEL: NO PAIN (0)

## 2022-12-14 NOTE — PROGRESS NOTES
SUBJECTIVE:   CC: Baljinder is an 37 year old who presents for preventative health visit.     Patient has been advised of split billing requirements and indicates understanding: Yes  Healthy Habits:     Getting at least 3 servings of Calcium per day:  Yes    Bi-annual eye exam:  Yes    Dental care twice a year:  Yes    Sleep apnea or symptoms of sleep apnea:  None    Diet:  Gluten-free/reduced    Frequency of exercise:  2-3 days/week    Duration of exercise:  15-30 minutes    Taking medications regularly:  Yes    Medication side effects:  None    PHQ-2 Total Score: 0    Additional concerns today:  No    Faheem is here with his father. Overall he is doing well.    Trisomy 21.  Celiac disease. No active GI sx at this time. Occasional stool incontinence, but can be formed stool. No significant issues w/ diarrhea or blood in stools.       Today's PHQ-2 Score:   PHQ-2 ( 1999 [employer]) 12/14/2022   Q1: Little interest or pleasure in doing things 0   Q2: Feeling down, depressed or hopeless 0   PHQ-2 Score 0   PHQ-2 Total Score (12-17 Years)- Positive if 3 or more points; Administer PHQ-A if positive -   Q1: Little interest or pleasure in doing things Not at all   Q2: Feeling down, depressed or hopeless Not at all   PHQ-2 Score 0       Social History     Tobacco Use     Smoking status: Never     Smokeless tobacco: Never   Substance Use Topics     Alcohol use: No     If you drink alcohol do you typically have >3 drinks per day or >7 drinks per week? No    Alcohol Use 12/14/2022   Prescreen: >3 drinks/day or >7 drinks/week? Not Applicable   Prescreen: >3 drinks/day or >7 drinks/week? -     Reviewed orders with patient. Reviewed health maintenance and updated orders accordingly - Yes      Review of Systems   Constitutional: Negative for chills and fever.   HENT: Negative for congestion, ear pain, hearing loss and sore throat.    Eyes: Negative for visual disturbance.   Respiratory: Negative for cough and shortness of breath.   "  Cardiovascular: Negative for chest pain, palpitations and peripheral edema.   Gastrointestinal: Negative for abdominal pain, constipation, diarrhea, heartburn, hematochezia and nausea.   Genitourinary: Negative for dysuria, frequency, genital sores, hematuria, impotence, penile discharge and urgency.   Musculoskeletal: Negative for arthralgias, joint swelling and myalgias.   Skin: Negative for rash.   Neurological: Negative for dizziness, weakness, headaches and paresthesias.   Psychiatric/Behavioral: Negative for mood changes. The patient is not nervous/anxious.        OBJECTIVE:   /64 (BP Location: Right arm, Patient Position: Sitting, Cuff Size: Adult Large)   Pulse 87   Temp 98.1  F (36.7  C) (Temporal)   Resp 16   Ht 1.571 m (5' 1.85\")   Wt 47.4 kg (104 lb 8 oz)   SpO2 100%   BMI 19.21 kg/m      Physical Exam  GENERAL: healthy, alert and no distress  EYES: PERRL, EOMI  HENT: ear canals and TM's normal  NECK: no adenopathy  RESP: lungs clear to auscultation - no rales, rhonchi or wheezes  CV: regular rate and rhythm, normal S1 S2, no murmur, no peripheral edema and peripheral pulses strong  ABDOMEN: soft, nontender, bowel sounds normal  MS: no gross musculoskeletal defects noted  SKIN: no suspicious lesions or rashes  NEURO: Normal strength and tone  PSYCH: mentation appears normal, affect normal/bright        ASSESSMENT/PLAN:       ICD-10-CM    1. Routine general medical examination at a health care facility  Z00.00 TDAP VACCINE (Adacel, Boostrix)      2. Trisomy 21  Q90.9       3. Celiac disease  K90.0         Overall he is doing well  Had labwork last year. Plan to repeat next year.       COUNSELING:   Reviewed preventive health counseling, as reflected in patient instructions        He reports that he has never smoked. He has never used smokeless tobacco.    Surya Javier MD  [facility]  "

## 2023-01-02 ENCOUNTER — DOCUMENTATION ONLY (OUTPATIENT)
Dept: OTHER | Facility: CLINIC | Age: 38
End: 2023-01-02

## 2023-01-14 ENCOUNTER — HEALTH MAINTENANCE LETTER (OUTPATIENT)
Age: 38
End: 2023-01-14

## 2023-06-15 ENCOUNTER — TELEPHONE (OUTPATIENT)
Dept: PEDIATRICS | Facility: CLINIC | Age: 38
End: 2023-06-15

## 2023-06-15 NOTE — TELEPHONE ENCOUNTER
Received call from law firm requesting guardianship forms sent the end of May. Found form faxed 5/30/23 and faxed to law [employer].  Maddy Pruett LPN

## 2023-07-26 ENCOUNTER — DOCUMENTATION ONLY (OUTPATIENT)
Dept: OTHER | Facility: CLINIC | Age: 38
End: 2023-07-26

## 2023-11-14 ENCOUNTER — PATIENT OUTREACH (OUTPATIENT)
Dept: CARE COORDINATION | Facility: CLINIC | Age: 38
End: 2023-11-14

## 2023-11-28 ENCOUNTER — PATIENT OUTREACH (OUTPATIENT)
Dept: CARE COORDINATION | Facility: CLINIC | Age: 38
End: 2023-11-28

## 2023-11-29 ASSESSMENT — ENCOUNTER SYMPTOMS
CHILLS: 0
MYALGIAS: 0
HEMATURIA: 0
DYSURIA: 0
FEVER: 0
ARTHRALGIAS: 0
PARESTHESIAS: 0
HEMATOCHEZIA: 0
PALPITATIONS: 0
COUGH: 0
FREQUENCY: 0
DIZZINESS: 0
HEARTBURN: 0
JOINT SWELLING: 0
EYE PAIN: 0
CONSTIPATION: 0
SHORTNESS OF BREATH: 0
ABDOMINAL PAIN: 0
NAUSEA: 0
SORE THROAT: 0
WEAKNESS: 0
HEADACHES: 0
NERVOUS/ANXIOUS: 0
DIARRHEA: 0

## 2023-12-01 ENCOUNTER — OFFICE VISIT (OUTPATIENT)
Dept: PEDIATRICS | Facility: CLINIC | Age: 38
End: 2023-12-01
Payer: COMMERCIAL

## 2023-12-01 VITALS
OXYGEN SATURATION: 100 % | DIASTOLIC BLOOD PRESSURE: 71 MMHG | HEIGHT: 62 IN | HEART RATE: 91 BPM | BODY MASS INDEX: 18.61 KG/M2 | RESPIRATION RATE: 20 BRPM | WEIGHT: 101.1 LBS | SYSTOLIC BLOOD PRESSURE: 110 MMHG | TEMPERATURE: 97.6 F

## 2023-12-01 DIAGNOSIS — K90.0 CELIAC DISEASE: ICD-10-CM

## 2023-12-01 DIAGNOSIS — L30.9 DERMATITIS: ICD-10-CM

## 2023-12-01 DIAGNOSIS — Z00.00 ROUTINE GENERAL MEDICAL EXAMINATION AT A HEALTH CARE FACILITY: Primary | ICD-10-CM

## 2023-12-01 DIAGNOSIS — I49.8 BIGEMINY: ICD-10-CM

## 2023-12-01 DIAGNOSIS — I49.9 IRREGULAR HEARTBEAT: ICD-10-CM

## 2023-12-01 DIAGNOSIS — R15.9 INCONTINENCE OF FECES, UNSPECIFIED FECAL INCONTINENCE TYPE: ICD-10-CM

## 2023-12-01 DIAGNOSIS — Q90.9 TRISOMY 21: ICD-10-CM

## 2023-12-01 LAB
ANION GAP SERPL CALCULATED.3IONS-SCNC: 8 MMOL/L (ref 7–15)
BUN SERPL-MCNC: 14 MG/DL (ref 6–20)
CALCIUM SERPL-MCNC: 9.2 MG/DL (ref 8.6–10)
CHLORIDE SERPL-SCNC: 102 MMOL/L (ref 98–107)
CREAT SERPL-MCNC: 0.75 MG/DL (ref 0.67–1.17)
DEPRECATED HCO3 PLAS-SCNC: 29 MMOL/L (ref 22–29)
EGFRCR SERPLBLD CKD-EPI 2021: >90 ML/MIN/1.73M2
GLUCOSE SERPL-MCNC: 88 MG/DL (ref 70–99)
MAGNESIUM SERPL-MCNC: 2 MG/DL (ref 1.7–2.3)
POTASSIUM SERPL-SCNC: 4.3 MMOL/L (ref 3.4–5.3)
SODIUM SERPL-SCNC: 139 MMOL/L (ref 135–145)
TSH SERPL DL<=0.005 MIU/L-ACNC: 2.29 UIU/ML (ref 0.3–4.2)

## 2023-12-01 PROCEDURE — 36415 COLL VENOUS BLD VENIPUNCTURE: CPT | Performed by: INTERNAL MEDICINE

## 2023-12-01 PROCEDURE — 80048 BASIC METABOLIC PNL TOTAL CA: CPT | Performed by: INTERNAL MEDICINE

## 2023-12-01 PROCEDURE — 84443 ASSAY THYROID STIM HORMONE: CPT | Performed by: INTERNAL MEDICINE

## 2023-12-01 PROCEDURE — 99395 PREV VISIT EST AGE 18-39: CPT | Performed by: INTERNAL MEDICINE

## 2023-12-01 PROCEDURE — 99214 OFFICE O/P EST MOD 30 MIN: CPT | Mod: 25 | Performed by: INTERNAL MEDICINE

## 2023-12-01 PROCEDURE — 93000 ELECTROCARDIOGRAM COMPLETE: CPT | Mod: 76 | Performed by: INTERNAL MEDICINE

## 2023-12-01 PROCEDURE — 83735 ASSAY OF MAGNESIUM: CPT | Performed by: INTERNAL MEDICINE

## 2023-12-01 ASSESSMENT — ENCOUNTER SYMPTOMS
PALPITATIONS: 0
SORE THROAT: 0
CHILLS: 0
ARTHRALGIAS: 0
FREQUENCY: 0
HEADACHES: 0
COUGH: 0
EYE PAIN: 0
FEVER: 0
HEMATOCHEZIA: 0
SHORTNESS OF BREATH: 0
DYSURIA: 0
MYALGIAS: 0
DIARRHEA: 0
JOINT SWELLING: 0
NERVOUS/ANXIOUS: 0
WEAKNESS: 0
NAUSEA: 0
ABDOMINAL PAIN: 0
HEARTBURN: 0
PARESTHESIAS: 0
CONSTIPATION: 0
HEMATURIA: 0
DIZZINESS: 0

## 2023-12-01 NOTE — PROGRESS NOTES
SUBJECTIVE:   Faheem is a 38 year old, presenting for the following:  Physical        12/1/2023     9:56 AM   Additional Questions   Roomed by Alfreda Sahu   Accompanied by  Dad, Bryant        Healthy Habits:     Getting at least 3 servings of Calcium per day:  Yes    Bi-annual eye exam:  Yes    Dental care twice a year:  Yes    Sleep apnea or symptoms of sleep apnea:  None    Diet:  Gluten-free/reduced    Frequency of exercise:  2-3 days/week    Duration of exercise:  15-30 minutes    Taking medications regularly:  Yes    Medication side effects:  None    Additional concerns today:  No    Faheem is here with his father today for CPE. Overall he is doing well.    Trisomy 21    Celiac disease - following diet diligently. He has been having intermittent stool incontinence. Occurs 1-2 times per month. No specific inciting event known. His stools are typically not hard. Occur once per day. No GI meds tried. Reviewed options.    Dermatitis on the upper chest. Present for the past few years. Seems worse recently. Not pruritic. No cause known.     Social History     Tobacco Use    Smoking status: Never    Smokeless tobacco: Never   Substance Use Topics    Alcohol use: No         11/29/2023     8:18 PM   Alcohol Use   Prescreen: >3 drinks/day or >7 drinks/week? Not Applicable       Reviewed orders with patient. Reviewed health maintenance and updated orders accordingly - Yes    Review of Systems   Constitutional:  Negative for chills and fever.   HENT:  Negative for congestion, ear pain, hearing loss and sore throat.    Eyes:  Negative for pain and visual disturbance.   Respiratory:  Negative for cough and shortness of breath.    Cardiovascular:  Negative for chest pain, palpitations and peripheral edema.   Gastrointestinal:  Negative for abdominal pain, constipation, diarrhea, heartburn, hematochezia and nausea.   Genitourinary:  Negative for dysuria, frequency, genital sores, hematuria, impotence, penile discharge and urgency.  "  Musculoskeletal:  Negative for arthralgias, joint swelling and myalgias.   Skin:  Negative for rash.   Neurological:  Negative for dizziness, weakness, headaches and paresthesias.   Psychiatric/Behavioral:  Negative for mood changes. The patient is not nervous/anxious.        OBJECTIVE:   /71 (BP Location: Right arm, Patient Position: Sitting, Cuff Size: Adult Regular)   Pulse 91   Temp 97.6  F (36.4  C) (Tympanic)   Resp 20   Ht 1.587 m (5' 2.48\")   Wt 45.9 kg (101 lb 1.6 oz)   SpO2 100%   BMI 18.21 kg/m      Physical Exam  GENERAL: healthy, alert and no distress  EYES: PERRL, EOMI  HENT: ear canals and TM's normal. No nasal discharge. OP moist.  NECK: no adenopathy  RESP: lungs clear to auscultation - no rales, rhonchi or wheezes  CV: regular rate with an irregular rhythm - very frequent early systoles. normal S1 S2, no murmur, no peripheral edema and peripheral pulses strong  ABDOMEN: soft, nontender, bowel sounds normal  MS: no gross musculoskeletal defects noted  SKIN: no suspicious lesions or rashes  NEURO: Normal strength and tone  PSYCH: mentation appears normal, affect normal/bright    EKG (2 done today): One shows PVC's in a bigeminy pattern, the other in trigeminy pattern. No acute ST or T changes noted.       ASSESSMENT/PLAN:       ICD-10-CM    1. Routine general medical examination at a health care facility  Z00.00 EKG 12-lead complete w/read - Clinics     Basic metabolic panel  (Ca, Cl, CO2, Creat, Gluc, K, Na, BUN)     Magnesium     TSH with free T4 reflex     Basic metabolic panel  (Ca, Cl, CO2, Creat, Gluc, K, Na, BUN)     Magnesium     TSH with free T4 reflex      2. Trisomy 21  Q90.9       3. Celiac disease  K90.0       4. Irregular heartbeat  I49.9 EKG 12-lead complete w/read - Clinics      5. Dermatitis  L30.9 Adult Dermatology  Referral      6. Bigeminy  I49.8 Basic metabolic panel  (Ca, Cl, CO2, Creat, Gluc, K, Na, BUN)     Magnesium     TSH with free T4 reflex     " Adult Cardiology [facility] Referral     Adult Leadless EKG Monitor 3 to 7 Days     Basic metabolic panel  (Ca, Cl, CO2, Creat, Gluc, K, Na, BUN)     Magnesium     TSH with free T4 reflex      7. Incontinence of feces, unspecified fecal incontinence type  R15.9         Overall Faheem is feeling well.  We reviewed options for his stool incontinence. This may be overflow, suggest starting with Miralax daily for the next few weeks. If that is not helpful, may warrant GI consultation. Continue to be diligent with Celiac dietary restrictions.    Irregular heart rate - on EKG's has bigeminy / trigeminy.   Recommend labs as above to r/o thyroid or electrolyte abnormality. Zio patch to better quantify. Cardiology consultation.       COUNSELING:   Reviewed preventive health counseling, as reflected in patient instructions        He reports that he has never smoked. He has never used smokeless tobacco.          Surya Javier MD  [employer]

## 2023-12-01 NOTE — PATIENT INSTRUCTIONS
For the heart:  You will be contacted to set up a Zio patch  You also will be contacted to schedule a cardiology visit (OK if this is even a few months out)    Dermatology:  Call [facility] to schedule a visit    Check labwork today   I will contact you with the results     Miralax 1/2 - 1 capful once daily to help with stool incontinence         Preventive Health Recommendations    Yearly exam:             See your health care provider every year in order to  o   Review health changes.   o   Discuss preventive care.      Shots: Get a flu shot each year. Get a tetanus shot every 10 years.     Nutrition:  Eat at least 5 servings of fruits and vegetables daily.   Eat whole-grain bread, whole-wheat pasta and brown rice instead of white grains and rice.   Get adequate Calcium and Vitamin D.     Lifestyle  Exercise for at least 150 minutes a week (30 minutes a day, 5 days a week). This will help you control your weight and prevent disease.   Limit alcohol to one drink per day.   No smoking.   Wear sunscreen to prevent skin cancer.   See your dentist every six months for an exam and cleaning.

## 2023-12-11 ENCOUNTER — HOSPITAL ENCOUNTER (OUTPATIENT)
Dept: CARDIOLOGY | Facility: CLINIC | Age: 38
Discharge: HOME | End: 2023-12-11
Attending: INTERNAL MEDICINE | Admitting: INTERNAL MEDICINE
Payer: COMMERCIAL

## 2023-12-11 DIAGNOSIS — I49.8 BIGEMINY: ICD-10-CM

## 2023-12-11 PROCEDURE — 93242 EXT ECG>48HR<7D RECORDING: CPT

## 2023-12-11 PROCEDURE — 93244 EXT ECG>48HR<7D REV&INTERPJ: CPT | Performed by: INTERNAL MEDICINE

## 2023-12-26 DIAGNOSIS — I49.8 BIGEMINY: ICD-10-CM

## 2023-12-26 DIAGNOSIS — I49.9 IRREGULAR HEARTBEAT: ICD-10-CM

## 2023-12-26 DIAGNOSIS — Q90.9 TRISOMY 21: Primary | ICD-10-CM

## 2023-12-28 ENCOUNTER — HOSPITAL ENCOUNTER (OUTPATIENT)
Dept: CARDIOLOGY | Facility: CLINIC | Age: 38
Discharge: HOME | End: 2023-12-28
Attending: INTERNAL MEDICINE | Admitting: INTERNAL MEDICINE
Payer: MEDICARE

## 2023-12-28 DIAGNOSIS — I49.8 BIGEMINY: ICD-10-CM

## 2023-12-28 DIAGNOSIS — Q90.9 TRISOMY 21: ICD-10-CM

## 2023-12-28 DIAGNOSIS — I49.9 IRREGULAR HEARTBEAT: ICD-10-CM

## 2023-12-28 LAB — LVEF ECHO: NORMAL

## 2023-12-28 PROCEDURE — 93306 TTE W/DOPPLER COMPLETE: CPT | Mod: 26 | Performed by: INTERNAL MEDICINE

## 2023-12-28 PROCEDURE — 93306 TTE W/DOPPLER COMPLETE: CPT

## 2024-01-08 ENCOUNTER — OFFICE VISIT (OUTPATIENT)
Dept: CARDIOLOGY | Facility: CLINIC | Age: 39
End: 2024-01-08
Payer: MEDICARE

## 2024-01-08 VITALS
OXYGEN SATURATION: 99 % | WEIGHT: 101.1 LBS | HEART RATE: 77 BPM | BODY MASS INDEX: 18.61 KG/M2 | DIASTOLIC BLOOD PRESSURE: 65 MMHG | SYSTOLIC BLOOD PRESSURE: 100 MMHG | HEIGHT: 62 IN

## 2024-01-08 DIAGNOSIS — I49.8 VENTRICULAR BIGEMINY: Primary | ICD-10-CM

## 2024-01-08 DIAGNOSIS — I49.8 BIGEMINY: ICD-10-CM

## 2024-01-08 DIAGNOSIS — Q67.6 PECTUS EXCAVATUM: ICD-10-CM

## 2024-01-08 DIAGNOSIS — Q90.9 TRISOMY 21: ICD-10-CM

## 2024-01-08 DIAGNOSIS — K90.0 CELIAC DISEASE: ICD-10-CM

## 2024-01-08 PROCEDURE — 99204 OFFICE O/P NEW MOD 45 MIN: CPT | Performed by: INTERNAL MEDICINE

## 2024-01-08 NOTE — LETTER
1/8/2024    Surya Javier MD  [address] [place] [address]    RE: Baljinder ACEVES Howie       Dear Colleague,     I had the pleasure of seeing Baljinder Denise in the [facility].  HPI and Plan:   I had the pleasure of seeing Faheem Denise in cardiology clinic for PVCs.     Faheem is a pleasant 38-year-old male.  He has trisomy 21 and has some mental limitations.  He is accompanied by his father.    He was seen by his primary care provider Dr. Lutz who auscultated an irregular heartbeat.  An EKG was done which showed PVCs.  Subsequently he was referred for a Zio patch monitor.  I was able to review the Zio patch films today.  It showed frequent PVCs and ventricular bigeminy.  There was a 20% burden of PVCs.  An echocardiogram was done which revealed normal ejection fraction.  No valvular disease was noted.    Patient does not have any symptoms related to PVCs.  He denies any chest pain or shortness of breath or dizziness or syncope.  He remains active.  He goes to adult day care for 5 days a week.    He also had labs including BMP and magnesium level which were within normal limits.    I reviewed the EKG done by the primary care provider.  It shows monomorphic PVCs.  They appear to be of RVOT origin.    On exam, slightly irregular S1-S2 with auscultate with PVCs.  No murmurs.  Chest with good auscultation.  Pectus excavated him noted.    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  Trisomy 21  Pectus excavated him  Normal LV size and function    Discussion  Although patient has frequent PVCs, they appear to be of right ventricular outflow origin and he is asymptomatic.  Despite 20% burden on Zio patch, echocardiogram reveals normal LV size and function.  At this time I recommend exercise treadmill test to see if there is exercise-induced worsening of arrhythmias.  Also will rule out ischemia.  If exercise treadmill test is  stable, would recommend follow-up in a year with my nurse practitioner.  At that time we will repeat an echocardiogram to ensure that despite the PVCs, LV size and function continues to remain normal.  On the other hand if there are interim symptoms related to PVCs, of asked her father to call us.  We will call him with the results of the exercise treadmill test.  Thank you for allowing us to personally care of this nice patient.    Sincerely,    Nathan Kumar MD    Today's clinic visit entailed:  Review of external notes as documented elsewhere in note  Review of the result(s) of each unique test - EKG, echo, ziopatch  The following tests were independently interpreted by me as noted in my documentation: EKG, ziopatch  Ordering of each unique test    Provider  Link to [facility] Help Grid           Orders Placed This Encounter   Procedures    Follow-Up with Cardiology [facility]    Exercise Stress Test (Stress ECG)    Echocardiogram Complete       No orders of the defined types were placed in this encounter.      There are no discontinued medications.      Encounter Diagnoses   Name Primary?    Bigeminy     Trisomy 21     Celiac disease     Ventricular bigeminy Yes    Pectus excavatum        CURRENT MEDICATIONS:  Current Outpatient Medications   Medication Sig Dispense Refill    ascorbic acid (VITAMIN C) 250 MG CHEW chewable tablet Take 250 mg by mouth daily      Pediatric Multiple Vit-C-FA (MULTIVITAMIN CHILDRENS) CHEW          ALLERGIES     Allergies   Allergen Reactions    Gluten Meal     Dairy Products [Milk Protein]        PAST MEDICAL HISTORY:  Past Medical History:   Diagnosis Date    Gastro-oesophageal reflux disease     Pneumonia 11/89    Hospitalized       PAST SURGICAL HISTORY:  Past Surgical History:   Procedure Laterality Date    BIOPSY  09/01/10    for Celiac    HERNIORRHAPHY INGUINAL  5/27/2014    Procedure: HERNIORRHAPHY INGUINAL;  Surgeon: Aaron Martinez MD;  Location:  OR       FAMILY HISTORY:  Family  History   Problem Relation Age of Onset    Genetic Disorder Mother         Celiac    Gallbladder Disease Mother     Hypertension Maternal Grandmother     Heart Disease Maternal Grandmother     Cerebrovascular Disease Maternal Grandfather        SOCIAL HISTORY:  Social History     Socioeconomic History    Marital status: Single     Spouse name: None    Number of children: None    Years of education: None    Highest education level: None   Occupational History     Employer: [id number]     Comment: - Shaina Ocampo. Phone [phone]   Tobacco Use    Smoking status: Never    Smokeless tobacco: Never   Substance and Sexual Activity    Alcohol use: No    Drug use: No    Sexual activity: Never   Other Topics Concern    Parent/sibling w/ CABG, MI or angioplasty before 65F 55M? Yes     Comment: Mother     Social Determinants of Health     Financial Resource Strain: Low Risk  (11/29/2023)    Financial Resource Strain     Within the past 12 months, have you or your family members you live with been unable to get utilities (heat, electricity) when it was really needed?: No   Food Insecurity: Low Risk  (11/29/2023)    Food Insecurity     Within the past 12 months, did you worry that your food would run out before you got money to buy more?: No     Within the past 12 months, did the food you bought just not last and you didn t have money to get more?: No   Transportation Needs: Low Risk  (11/29/2023)    Transportation Needs     Within the past 12 months, has lack of transportation kept you from medical appointments, getting your medicines, non-medical meetings or appointments, work, or from getting things that you need?: No   Physical Activity: Insufficiently Active (12/14/2022)    Exercise Vital Sign     Days of Exercise per Week: 4 days     Minutes of Exercise per Session: 20 min   Stress: No Stress Concern Present (12/14/2022)    [employer] [place] [employer] -  "Occupational Stress Questionnaire     Feeling of Stress : Not at all   Social Connections: Unknown (12/14/2022)    Social Connection and Isolation Panel [NHANES]     Frequency of Social Gatherings with Friends and Family: Once a week     Attends [employer] Services: More than 4 times per year     Active Member of Clubs or Organizations: Yes     Marital Status: Never    Housing Stability: Low Risk  (11/29/2023)    Housing Stability     Do you have housing? : Yes     Are you worried about losing your housing?: No       Review of Systems:  Skin:          Eyes:         ENT:         Respiratory:  Negative       Cardiovascular:  Negative;chest pain;palpitations;lightheadedness;edema;fatigue;dizziness      Gastroenterology:        Genitourinary:         Musculoskeletal:         Neurologic:         Psychiatric:         Heme/Lymph/Imm:         Endocrine:  Negative        Physical Exam:  Vitals: /65 (BP Location: Left arm, Patient Position: Sitting)   Pulse 77   Ht 1.587 m (5' 2.48\")   Wt 45.9 kg (101 lb 1.6 oz)   SpO2 99%   BMI 18.21 kg/m          CC  Surya Javier MD  [address] [place] [address]        Thank you for allowing me to participate in the care of your patient.      Sincerely,     Nathan Kumar MD     [facility]    "

## 2024-01-08 NOTE — PROGRESS NOTES
HPI and Plan:   I had the pleasure of seeing Faheem Denise in cardiology clinic for PVCs.     Faheem is a pleasant 38-year-old male.  He has trisomy 21 and has some mental limitations.  He is accompanied by his father.    He was seen by his primary care provider Dr. Lutz who auscultated an irregular heartbeat.  An EKG was done which showed PVCs.  Subsequently he was referred for a Zio patch monitor.  I was able to review the Zio patch films today.  It showed frequent PVCs and ventricular bigeminy.  There was a 20% burden of PVCs.  An echocardiogram was done which revealed normal ejection fraction.  No valvular disease was noted.    Patient does not have any symptoms related to PVCs.  He denies any chest pain or shortness of breath or dizziness or syncope.  He remains active.  He goes to adult day care for 5 days a week.    He also had labs including BMP and magnesium level which were within normal limits.    I reviewed the EKG done by the primary care provider.  It shows monomorphic PVCs.  They appear to be of RVOT origin.    On exam, slightly irregular S1-S2 with auscultate with PVCs.  No murmurs.  Chest with good auscultation.  Pectus excavated him noted.    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  Trisomy 21  Pectus excavated him  Normal LV size and function    Discussion  Although patient has frequent PVCs, they appear to be of right ventricular outflow origin and he is asymptomatic.  Despite 20% burden on Zio patch, echocardiogram reveals normal LV size and function.  At this time I recommend exercise treadmill test to see if there is exercise-induced worsening of arrhythmias.  Also will rule out ischemia.  If exercise treadmill test is stable, would recommend follow-up in a year with my nurse practitioner.  At that time we will repeat an echocardiogram to ensure that despite the PVCs, LV size and function continues to remain normal.  On the other hand  if there are interim symptoms related to PVCs, of asked her father to call us.  We will call him with the results of the exercise treadmill test.  Thank you for allowing us to personally care of this nice patient.    Sincerely,    Nathan Kumar MD    Today's clinic visit entailed:  Review of external notes as documented elsewhere in note  Review of the result(s) of each unique test - EKG, echo, ziopatch  The following tests were independently interpreted by me as noted in my documentation: EKG, ziopatch  Ordering of each unique test    Provider  Link to [facility] Help Grid           Orders Placed This Encounter   Procedures    Follow-Up with Cardiology [facility]    Exercise Stress Test (Stress ECG)    Echocardiogram Complete       No orders of the defined types were placed in this encounter.      There are no discontinued medications.      Encounter Diagnoses   Name Primary?    Bigeminy     Trisomy 21     Celiac disease     Ventricular bigeminy Yes    Pectus excavatum        CURRENT MEDICATIONS:  Current Outpatient Medications   Medication Sig Dispense Refill    ascorbic acid (VITAMIN C) 250 MG CHEW chewable tablet Take 250 mg by mouth daily      Pediatric Multiple Vit-C-FA (MULTIVITAMIN CHILDRENS) CHEW          ALLERGIES     Allergies   Allergen Reactions    Gluten Meal     Dairy Products [Milk Protein]        PAST MEDICAL HISTORY:  Past Medical History:   Diagnosis Date    Gastro-oesophageal reflux disease     Pneumonia 11/89    Hospitalized       PAST SURGICAL HISTORY:  Past Surgical History:   Procedure Laterality Date    BIOPSY  09/01/10    for Celiac    HERNIORRHAPHY INGUINAL  5/27/2014    Procedure: HERNIORRHAPHY INGUINAL;  Surgeon: Aaron Martinez MD;  Location:  OR       FAMILY HISTORY:  Family History   Problem Relation Age of Onset    Genetic Disorder Mother         Celiac    Gallbladder Disease Mother     Hypertension Maternal Grandmother     Heart Disease Maternal Grandmother     Cerebrovascular Disease  Maternal Grandfather        SOCIAL HISTORY:  Social History     Socioeconomic History    Marital status: Single     Spouse name: None    Number of children: None    Years of education: None    Highest education level: None   Occupational History     Employer: [employer],[id number] [address]     Comment: - Shaina Ocampo. Phone [phone]   Tobacco Use    Smoking status: Never    Smokeless tobacco: Never   Substance and Sexual Activity    Alcohol use: No    Drug use: No    Sexual activity: Never   Other Topics Concern    Parent/sibling w/ CABG, MI or angioplasty before 65F 55M? Yes     Comment: Mother     Social Determinants of Health     Financial Resource Strain: Low Risk  (11/29/2023)    Financial Resource Strain     Within the past 12 months, have you or your family members you live with been unable to get utilities (heat, electricity) when it was really needed?: No   Food Insecurity: Low Risk  (11/29/2023)    Food Insecurity     Within the past 12 months, did you worry that your food would run out before you got money to buy more?: No     Within the past 12 months, did the food you bought just not last and you didn t have money to get more?: No   Transportation Needs: Low Risk  (11/29/2023)    Transportation Needs     Within the past 12 months, has lack of transportation kept you from medical appointments, getting your medicines, non-medical meetings or appointments, work, or from getting things that you need?: No   Physical Activity: Insufficiently Active (12/14/2022)    Exercise Vital Sign     Days of Exercise per Week: 4 days     Minutes of Exercise per Session: 20 min   Stress: No Stress Concern Present (12/14/2022)    [employer] - Occupational Stress Questionnaire     Feeling of Stress : Not at all   Social Connections: Unknown (12/14/2022)    Social Connection and Isolation Panel [NHANES]     Frequency of Social Gatherings with Friends and Family: Once  "a week     Attends [employer] Services: More than 4 times per year     Active Member of Clubs or Organizations: Yes     Marital Status: Never    Housing Stability: Low Risk  (11/29/2023)    Housing Stability     Do you have housing? : Yes     Are you worried about losing your housing?: No       Review of Systems:  Skin:          Eyes:         ENT:         Respiratory:  Negative       Cardiovascular:  Negative;chest pain;palpitations;lightheadedness;edema;fatigue;dizziness      Gastroenterology:        Genitourinary:         Musculoskeletal:         Neurologic:         Psychiatric:         Heme/Lymph/Imm:         Endocrine:  Negative        Physical Exam:  Vitals: /65 (BP Location: Left arm, Patient Position: Sitting)   Pulse 77   Ht 1.587 m (5' 2.48\")   Wt 45.9 kg (101 lb 1.6 oz)   SpO2 99%   BMI 18.21 kg/m          CC  Surya Javier MD  [address] [place] [address]                "

## 2024-01-19 ENCOUNTER — TELEPHONE (OUTPATIENT)
Dept: CARDIOLOGY | Facility: CLINIC | Age: 39
End: 2024-01-19

## 2024-01-19 ENCOUNTER — HOSPITAL ENCOUNTER (OUTPATIENT)
Dept: CARDIOLOGY | Facility: CLINIC | Age: 39
Discharge: HOME | End: 2024-01-19
Attending: INTERNAL MEDICINE | Admitting: INTERNAL MEDICINE
Payer: MEDICARE

## 2024-01-19 DIAGNOSIS — I49.8 VENTRICULAR BIGEMINY: ICD-10-CM

## 2024-01-19 PROCEDURE — 93018 CV STRESS TEST I&R ONLY: CPT | Performed by: INTERNAL MEDICINE

## 2024-01-19 PROCEDURE — 93017 CV STRESS TEST TRACING ONLY: CPT

## 2024-01-19 PROCEDURE — 93016 CV STRESS TEST SUPVJ ONLY: CPT | Performed by: INTERNAL MEDICINE

## 2024-01-19 NOTE — TELEPHONE ENCOUNTER
Call out to Pt's guardian, (Father) left message have test results , and asked for return call.     Results:Baseline  The patient is in normal sinus rhythm.  Resting ECG is normal.  Stress  The patient exercised 18 minutes.  RPP [id number].  The patient exhibited no chest pain during exercise.  There was a normal BP response to exercise.  Exercise was stopped due to fatigue.  A treadmill exercise test according to the Modified Familia protocol was  performed.  Target Heart Rate was achieved.  There were no ST segment changes observed with stress.  Arrhythmia noted in recovery: occasional PVC's.  No arrhythmia noted.  This was a normal stress EKG with no evidence of stress-induced ischemia.    JAUN Crowder RN

## 2024-08-16 ENCOUNTER — TELEPHONE (OUTPATIENT)
Dept: PEDIATRICS | Facility: CLINIC | Age: 39
End: 2024-08-16
Payer: MEDICARE

## 2024-08-16 NOTE — TELEPHONE ENCOUNTER
Forms/Letter Request    Type of form/letter: OTHER: [employer]       Do we have the form/letter: Yes: Form is on the provider's desk for review      Who is the form from? [employer] (if other please explain)    Where did/will the form come from? form was faxed in    When is form/letter needed by: unkn    ICD-10 and med list printed.    Nanci Garcia on 8/16/2024 at 11:35 AM

## 2025-01-05 ENCOUNTER — HEALTH MAINTENANCE LETTER (OUTPATIENT)
Age: 40
End: 2025-01-05

## 2025-02-02 SDOH — HEALTH STABILITY: PHYSICAL HEALTH: ON AVERAGE, HOW MANY DAYS PER WEEK DO YOU ENGAGE IN MODERATE TO STRENUOUS EXERCISE (LIKE A BRISK WALK)?: 3 DAYS

## 2025-02-02 SDOH — HEALTH STABILITY: PHYSICAL HEALTH: ON AVERAGE, HOW MANY MINUTES DO YOU ENGAGE IN EXERCISE AT THIS LEVEL?: 20 MIN

## 2025-02-02 ASSESSMENT — SOCIAL DETERMINANTS OF HEALTH (SDOH): HOW OFTEN DO YOU GET TOGETHER WITH FRIENDS OR RELATIVES?: MORE THAN THREE TIMES A WEEK

## 2025-02-04 ENCOUNTER — OFFICE VISIT (OUTPATIENT)
Dept: PEDIATRICS | Facility: CLINIC | Age: 40
End: 2025-02-04
Payer: MEDICARE

## 2025-02-04 VITALS
DIASTOLIC BLOOD PRESSURE: 70 MMHG | HEIGHT: 62 IN | WEIGHT: 101.8 LBS | HEART RATE: 69 BPM | RESPIRATION RATE: 18 BRPM | BODY MASS INDEX: 18.74 KG/M2 | OXYGEN SATURATION: 99 % | TEMPERATURE: 97 F | SYSTOLIC BLOOD PRESSURE: 106 MMHG

## 2025-02-04 DIAGNOSIS — Z00.00 ANNUAL WELLNESS VISIT: Primary | ICD-10-CM

## 2025-02-04 DIAGNOSIS — K40.90 LEFT INGUINAL HERNIA: ICD-10-CM

## 2025-02-04 PROCEDURE — 99213 OFFICE O/P EST LOW 20 MIN: CPT | Mod: 25 | Performed by: INTERNAL MEDICINE

## 2025-02-04 PROCEDURE — G0438 PPPS, INITIAL VISIT: HCPCS | Performed by: INTERNAL MEDICINE

## 2025-02-04 NOTE — PROGRESS NOTES
Preventive Care Visit  [facility]  Surya Javier MD, Internal Medicine - Pediatrics  Feb 4, 2025      Assessment & Plan       ICD-10-CM    1. Annual wellness visit  Z00.00       2. Left inguinal hernia  K40.90 Adult Gen Surg  Referral        Here for wellness visit. Overall is feeling well.     Left inguinal hernia. General surgery referral signed.     Hx of bigeminy. Clinically in sinus rhythm today.     Counseling  Appropriate preventive services were addressed with this patient via screening, questionnaire, or discussion as appropriate for fall prevention, nutrition, physical activity, social engagement.  Reviewed patient's diet, addressing concerns and/or questions.   He is at risk for lack of exercise and has been provided with information to increase physical activity for the benefit of his well-being.   Updated plan of care.  Patient reported difficulty with activities of daily living were addressed today.    Surya Javier MD     Abby Mayen is a 39 year old, presenting for the following:  Wellness Visit        2/4/2025     3:20 PM   Additional Questions   Roomed by OR   Accompanied by Father- Carlos         2/4/2025     3:20 PM   Patient Reported Additional Medications   Patient reports taking the following new medications None       HPI  Faheem is here with his father for AWV. Overall he is feeling well.    Hx of right inguinal hernia repair over a decade ago. Noting bulging and intermittent pain on the left side over the past few months. No symptoms of obstruction.     Dx w/ raiza last year. Cardiology visit. Normal echo and stress test. Is not having any cardiac sx. Is scheduled for routine cardiology follow-up in May.    Health Care Directive  Patient has a Health Care Directive on file  Advance care planning document is on file and is current.      2/2/2025   General Health   How would you rate your overall physical health? Good   Feel stress (tense, anxious, or unable to  sleep) Not at all         2/2/2025   Nutrition   Diet: Gluten-free/reduced         2/2/2025   Exercise   Days per week of moderate/strenous exercise 3 days   Average minutes spent exercising at this level 20 min         2/2/2025   Social Factors   Frequency of gathering with friends or relatives More than three times a week   Worry food won't last until get money to buy more No   Food not last or not have enough money for food? No   Do you have housing? (Housing is defined as stable permanent housing and does not include staying ouside in a car, in a tent, in an abandoned building, in an overnight shelter, or couch-surfing.) Yes   Are you worried about losing your housing? No   Lack of transportation? No   Unable to get utilities (heat,electricity)? No         2/2/2025   Fall Risk   Fallen 2 or more times in the past year? No   Trouble with walking or balance? No          2/2/2025   Activities of Daily Living- Home Safety   Needs help with the following daily activites Telephone use    Transportation    Shopping    Preparing meals    Housework    Bathing    Laundry    Medication administration    Money management   Safety concerns in the home None of the above       Multiple values from one day are sorted in reverse-chronological order         2/2/2025   Dental   Dentist two times every year? Yes         2/2/2025   Hearing Screening   Hearing concerns? None of the above         2/2/2025   Driving Risk Screening   Patient/family members have concerns about driving No         2/2/2025   General Alertness/Fatigue Screening   Have you been more tired than usual lately? No         2/2/2025   Urinary Incontinence Screening   Bothered by leaking urine in past 6 months No         2/2/2025   TB Screening   Were you born outside of the [place]? No         Today's PHQ-2 Score:       2/4/2025     3:09 PM   PHQ-2 ( 1999 [employer])   Q1: Little interest or pleasure in doing things 0   Q2: Feeling down, depressed or hopeless 0   PHQ-2  "Score 0    Q1: Little interest or pleasure in doing things Not at all   Q2: Feeling down, depressed or hopeless Not at all   PHQ-2 Score 0       Patient-reported           2/2/2025   Substance Use   Alcohol more than 3/day or more than 7/wk Not Applicable   Do you have a current opioid prescription? No   How severe/bad is pain from 1 to 10? 0/10 (No Pain)   Do you use any other substances recreationally? No     Social History     Tobacco Use    Smoking status: Never    Smokeless tobacco: Never   Substance Use Topics    Alcohol use: No    Drug use: No             2/2/2025   Contraception/Family Planning   Questions about contraception or family planning No         Current providers sharing in care for this patient include:  Patient Care Team:  Surya Javier MD as PCP - General (Internal Medicine)  Surya Javier MD as Assigned PCP  Nathan Kumar MD as Assigned Heart and Vascular Provider    The following health maintenance items are reviewed in Epic and correct as of today:  Health Maintenance   Topic Date Due    ANNUAL REVIEW OF  ORDERS  Never done    INFLUENZA VACCINE (1) 09/01/2024    COVID-19 Vaccine (3 - 2024-25 season) 09/01/2024    MEDICARE ANNUAL WELLNESS VISIT  12/01/2024    GLUCOSE  12/01/2026    ADVANCE CARE PLANNING  07/26/2028    DTAP/TDAP/TD IMMUNIZATION (8 - Td or Tdap) 12/14/2032    ZOSTER IMMUNIZATION (1 of 2) 10/19/2037    PHQ-2 (once per calendar year)  Completed    Pneumococcal Vaccine: Pediatrics (0 to 5 Years) and At-Risk Patients (6 to 49 Years)  Aged Out    HPV IMMUNIZATION  Aged Out    MENINGITIS IMMUNIZATION  Aged Out    HEPATITIS C SCREENING  Discontinued    HIV SCREENING  Discontinued    HEPATITIS B IMMUNIZATION  Discontinued            Objective    Exam  /70 (BP Location: Right arm, Patient Position: Sitting, Cuff Size: Adult Regular)   Pulse 69   Temp 97  F (36.1  C) (Temporal)   Resp 18   Ht 1.58 m (5' 2.21\")   Wt 46.2 kg (101 lb 12.8 oz)   SpO2 99%   BMI 18.50 " "kg/m     Estimated body mass index is 18.5 kg/m  as calculated from the following:    Height as of this encounter: 1.58 m (5' 2.21\").    Weight as of this encounter: 46.2 kg (101 lb 12.8 oz).    Physical Exam  GENERAL: healthy, alert and no distress  EYES: PERRL, EOMI  HENT: ear canals and TM's normal. No nasal discharge. OP moist.  NECK: no adenopathy  RESP: lungs clear to auscultation - no rales, rhonchi or wheezes  CV: regular rate and rhythm, normal S1 S2, no murmur, no peripheral edema  ABDOMEN: soft, nontender, bowel sounds normal  MS: no gross musculoskeletal defects noted  SKIN: no suspicious lesions or rashes  NEURO: Normal strength and tone  PSYCH: mentation appears normal, affect normal            2/4/2025   Mini Cog   Mini-Cog Not Completed (choose reason) Mental handicap              Signed Electronically by: Surya Javier MD    "

## 2025-03-20 ENCOUNTER — OFFICE VISIT (OUTPATIENT)
Dept: PEDIATRICS | Facility: CLINIC | Age: 40
End: 2025-03-20
Payer: MEDICARE

## 2025-03-20 VITALS
WEIGHT: 105.2 LBS | RESPIRATION RATE: 16 BRPM | OXYGEN SATURATION: 99 % | BODY MASS INDEX: 19.36 KG/M2 | HEART RATE: 56 BPM | HEIGHT: 62 IN | SYSTOLIC BLOOD PRESSURE: 110 MMHG | DIASTOLIC BLOOD PRESSURE: 60 MMHG | TEMPERATURE: 98.1 F

## 2025-03-20 DIAGNOSIS — Z01.818 PREOP GENERAL PHYSICAL EXAM: Primary | ICD-10-CM

## 2025-03-20 DIAGNOSIS — K40.90 LEFT INGUINAL HERNIA: ICD-10-CM

## 2025-03-20 DIAGNOSIS — Q90.9 TRISOMY 21: ICD-10-CM

## 2025-03-20 ASSESSMENT — PAIN SCALES - GENERAL: PAINLEVEL_OUTOF10: MODERATE PAIN (5)

## 2025-03-20 NOTE — PROGRESS NOTES
Preoperative Evaluation  [facility] [place]  [address]  [address]  [place] [address]  Phone: [phone]  Fax: [phone]  Primary Provider: Surya Javier MD  Pre-op Performing Provider: Vicki Ortega MD  Mar 20, 2025             3/19/2025   Surgical Information   What procedure is being done? hernia surgery   Facility or Hospital where procedure/surgery will be performed: [facility]   Who is doing the procedure / surgery? Dr. Martinez   Date of surgery / procedure: 3/24/2025   Time of surgery / procedure: Morning   Where do you plan to recover after surgery? at home with family     Fax number for surgical facility: Note does not need to be faxed, will be available electronically in Epic.    Assessment & Plan     The proposed surgical procedure is considered INTERMEDIATE risk.    1. Left inguinal hernia    2. Preop general physical exam (Primary)    3. Trisomy 21  Has not previously been evaluated for sleep apnea. Does not change underlying cardiac risk.      - No identified additional risk factors other than previously addressed    Preoperative Medication Instructions  Antiplatelet or Anticoagulation Medication Instructions   - We reviewed the medication list and the patient is not on an antiplatelet or anticoagulation medications.    Additional Medication Instructions  We reviewed the medication list and there are no chronic medications that need to be adjusted for this procedure.    Recommendation  Approval given to proceed with proposed procedure, without further diagnostic evaluation.    Subjective   Faheem is a 39 year old, presenting for the following:  Pre-Op Exam    L inguinal hernia repair with Dr Martinez on 3/24/2025.  No issues with anesthesia during prior surgeries. No hx of bleeding disorders, blood clots.           3/20/2025     2:45 PM   Additional Questions   Roomed by Yanci MONTANO   Accompanied by Davida Gonzalez         3/20/2025     2:45 PM   Patient  Reported Additional Medications   Patient reports taking the following new medications NA     HPI:               3/19/2025   Pre-Op Questionnaire   Have you ever had a heart attack or stroke? No   Have you ever had surgery on your heart or blood vessels, such as a stent placement, a coronary artery bypass, or surgery on an artery in your head, neck, heart, or legs? No   Do you have chest pain with activity? No   Do you have a history of heart failure? No   Do you currently have a cold, bronchitis or symptoms of other infection? No   Do you have a cough, shortness of breath, or wheezing? No   Do you or anyone in your family have previous history of blood clots? No   Do you or does anyone in your family have a serious bleeding problem such as prolonged bleeding following surgeries or cuts? No   Have you ever had problems with anemia or been told to take iron pills? No   Have you had any abnormal blood loss such as black, tarry or bloody stools? No   Have you ever had a blood transfusion? No   Are you willing to have a blood transfusion if it is medically needed before, during, or after your surgery? Yes   Have you or any of your relatives ever had problems with anesthesia? No   Do you have sleep apnea, excessive snoring or daytime drowsiness? No   Do you have any artifical heart valves or other implanted medical devices like a pacemaker, defibrillator, or continuous glucose monitor? No   Do you have artificial joints? No   Are you allergic to latex? No     Health Care Directive  Patient has a Health Care Directive on file      Preoperative Review of    reviewed - no record of controlled substances prescribed.        Patient Active Problem List    Diagnosis Date Noted    Pectus excavatum 01/08/2024     Priority: Medium    Ventricular bigeminy 12/01/2023     Priority: Medium    Psoriasis 11/02/2020     Priority: Medium    Trisomy 21 09/07/2010     Priority: Medium    GERD (gastroesophageal reflux disease)  "09/07/2010     Priority: Medium    Celiac disease 09/07/2010     Priority: Medium    Acne 09/07/2010     Priority: Medium      Past Medical History:   Diagnosis Date    Gastro-oesophageal reflux disease     Pneumonia 11/89    Hospitalized     Past Surgical History:   Procedure Laterality Date    BIOPSY  09/01/10    for Celiac    HERNIORRHAPHY INGUINAL  5/27/2014    Procedure: HERNIORRHAPHY INGUINAL;  Surgeon: Aaron Martinez MD;  Location: [facility] OR     Current Outpatient Medications   Medication Sig Dispense Refill    ascorbic acid (VITAMIN C) 250 MG CHEW chewable tablet Take 250 mg by mouth daily      Pediatric Multiple Vit-C-FA (MULTIVITAMIN CHILDRENS) CHEW          Allergies   Allergen Reactions    Gluten Meal     Dairy Products [Milk Protein]         Social History     Tobacco Use    Smoking status: Never     Passive exposure: Never    Smokeless tobacco: Never   Substance Use Topics    Alcohol use: No       History   Drug Use No               Objective    /60 (BP Location: Right arm, Patient Position: Sitting, Cuff Size: Adult Regular)   Pulse 56   Temp 98.1  F (36.7  C) (Oral)   Resp 16   Ht 1.571 m (5' 1.85\")   Wt 47.7 kg (105 lb 3.2 oz)   SpO2 99%   BMI 19.33 kg/m     Estimated body mass index is 19.33 kg/m  as calculated from the following:    Height as of this encounter: 1.571 m (5' 1.85\").    Weight as of this encounter: 47.7 kg (105 lb 3.2 oz).  Physical Exam  GENERAL: alert and no distress  EYES: Eyes grossly normal to inspection, PERRL and conjunctivae and sclerae normal  HENT: ear canals and TM's normal, nose and mouth without ulcers or lesions  NECK: no adenopathy, no asymmetry, masses, or scars  RESP: lungs clear to auscultation - no rales, rhonchi or wheezes  CV: regular rate and rhythm, normal S1 S2, no S3 or S4, no murmur, click or rub, no peripheral edema  ABDOMEN: soft, nontender, non distended  MS: no gross musculoskeletal defects noted, no edema  SKIN: no suspicious lesions or " "rashes  NEURO: Normal strength and tone, mentation intact and speech normal  PSYCH: mentation appears normal, affect normal/bright    No results for input(s): \"HGB\", \"PLT\", \"INR\", \"NA\", \"POTASSIUM\", \"CR\", \"A1C\" in the last 8760 hours.     Diagnostics  No labs were ordered during this visit.   No EKG required for low risk surgery (cataract, skin procedure, breast biopsy, etc).    Revised Cardiac Risk Index (RCRI)  The patient has the following serious cardiovascular risks for perioperative complications:   - No serious cardiac risks = 0 points     RCRI Interpretation: 0 points: Class I (very low risk - 0.4% complication rate)       Recent cardiac evaluation:Exercise stress test 1/8/24 -    A treadmill exercise test according to the Modified Familia protocol was  performed.  The patient exercised 18 minutes (13.4 METS).  The patient exhibited no chest pain during exercise.  There were no ST segment changes observed with stress.     This was a normal stress EKG with no evidence of stress-induced ischemia.        Signed Electronically by: Vicki Ortega MD  A copy of this evaluation report is provided to the requesting physician.    I have seen and examined the patient, discussed with the resident and agree with the history, physical and plan as documented above.    Massiel Martínez MD  Internal Medicine - Pediatrics           "

## 2025-03-20 NOTE — PATIENT INSTRUCTIONS
How to Take Your Medication Before Surgery  Preoperative Medication Instructions   Antiplatelet or Anticoagulation Medication Instructions   - We reviewed the medication list and the patient is not on an antiplatelet or anticoagulation medications.    Additional Medication Instructions  We reviewed the medication list and there are no chronic medications that need to be adjusted for this procedure.       Patient Education   Preparing for Your Surgery  For Adults  Getting started  In most cases, a nurse will call to review your health history and instructions. They will give you an arrival time based on your scheduled surgery time. Please be ready to share:  Your doctor's clinic name and phone number  Your medical, surgical, and anesthesia history  A list of allergies and sensitivities  A list of medicines, including herbal treatments and over-the-counter drugs  Whether the patient has a legal guardian (ask how to send us the papers in advance)  Note: You may not receive a call if you were seen at our PAC (Preoperative Assessment Center).  Please tell us if you're pregnant--or if there's any chance you might be pregnant. Some surgeries may injure a fetus (unborn baby), so they require a pregnancy test. Surgeries that are safe for a fetus don't always need a test, and you can choose whether to have one.   Preparing for surgery  Within 10 to 30 days of surgery: Have a pre-op exam (sometimes called an H&P, or History and Physical). This can be done at a clinic or pre-operative center.  If you're having a , you may not need this exam. Talk to your care team.  At your pre-op exam, talk to your care team about all medicines you take. (This includes CBD oil and any drugs, such as THC, marijuana, and other forms of cannabis.) If you need to stop any medicine before surgery, ask when to start taking it again.  This is for your safety. Many medicines and drugs can make you bleed too much during surgery. Some change  how well surgery (anesthesia) drugs work.  Call your insurance company to let them know you're having surgery. (If you don't have insurance, call [phone].)  Call your clinic if there's any change in your health. This includes a scrape or scratch near the surgery site, or any signs of a cold (sore throat, runny nose, cough, rash, fever).  Eating and drinking guidelines  For your safety: Unless your surgeon tells you otherwise, follow the guidelines below.  Eat and drink as normal until 8 hours before you arrive for surgery. After that, no food or milk. You can spit out gum when you arrive.  Drink clear liquids until 2 hours before you arrive. These are liquids you can see through, like water, Gatorade, and Propel Water. They also include plain black coffee and tea (no cream or milk).  No alcohol for 24 hours before you arrive. The night before surgery, stop any drinks that contain THC.  If your care team tells you to take medicine on the morning of surgery, it's okay to take it with a sip of water. No other medicines or drugs are allowed (including CBD oil)--follow your care team's instructions.  If you have questions the day of surgery, call your hospital or surgery center.   Preventing infection  Shower or bathe the night before and the morning of surgery. Follow the instructions your clinic gave you. (If no instructions, use regular soap.)  Don't shave or clip hair near your surgery site. We'll remove the hair if needed.  Don't smoke or vape the morning of surgery. No chewing tobacco for 6 hours before you arrive. A nicotine patch is okay. You may spit out nicotine gum when you arrive.  For some surgeries, the surgeon will tell you to fully quit smoking and nicotine.  We will make every effort to keep you safe from infection. We will:  Clean our hands often with soap and water (or an alcohol-based hand rub).  Clean the skin at your surgery site with a special soap that kills germs.  Give you a special gown to  keep you warm. (Cold raises the risk of infection.)  Wear hair covers, masks, gowns, and gloves during surgery.  Give antibiotic medicine, if prescribed. Not all surgeries need this medicine.  What to bring on the day of surgery  Photo ID and insurance card  Copy of your health care directive, if you have one  Glasses and hearing aids (bring cases)  You can't wear contacts during surgery  Inhaler and eye drops, if you use them (tell us about these when you arrive)  CPAP machine or breathing device, if you use them  A few personal items, if spending the night  If you have . . .  A pacemaker, ICD (cardiac defibrillator), or other implant: Bring the ID card.  An implanted stimulator: Bring the remote control.  A legal guardian: Bring a copy of the certified (court-stamped) guardianship papers.  Please remove any jewelry, including body piercings. Leave jewelry and other valuables at home.  If you're going home the day of surgery  You must have a responsible adult drive you home. They should stay with you overnight as well.  If you don't have someone to stay with you, and you aren't safe to go home alone, we may keep you overnight. Insurance often won't pay for this.  After surgery  If it's hard to control your pain or you need more pain medicine, please call your surgeon's office.  Questions?   If you have any questions for your care team, list them here:   ____________________________________________________________________________________________________________________________________________________________________________________________________________________________________________________________  For informational purposes only. Not to replace the advice of your health care provider. Copyright   2003, 2019 [facility]. All rights reserved. Clinically reviewed by Antoine Smith MD. [employer] [id number] - REV 08/24.     How to Take Your Medication Before Surgery  Preoperative Medication  Instructions   Antiplatelet or Anticoagulation Medication Instructions   - We reviewed the medication list and the patient is not on an antiplatelet or anticoagulation medications.    Additional Medication Instructions  We reviewed the medication list and there are no chronic medications that need to be adjusted for this procedure.       Patient Education   Preparing for Your Surgery  For Adults  Getting started  In most cases, a nurse will call to review your health history and instructions. They will give you an arrival time based on your scheduled surgery time. Please be ready to share:  Your doctor's clinic name and phone number  Your medical, surgical, and anesthesia history  A list of allergies and sensitivities  A list of medicines, including herbal treatments and over-the-counter drugs  Whether the patient has a legal guardian (ask how to send us the papers in advance)  Note: You may not receive a call if you were seen at our PAC (Preoperative Assessment Center).  Please tell us if you're pregnant--or if there's any chance you might be pregnant. Some surgeries may injure a fetus (unborn baby), so they require a pregnancy test. Surgeries that are safe for a fetus don't always need a test, and you can choose whether to have one.   Preparing for surgery  Within 10 to 30 days of surgery: Have a pre-op exam (sometimes called an H&P, or History and Physical). This can be done at a clinic or pre-operative center.  If you're having a , you may not need this exam. Talk to your care team.  At your pre-op exam, talk to your care team about all medicines you take. (This includes CBD oil and any drugs, such as THC, marijuana, and other forms of cannabis.) If you need to stop any medicine before surgery, ask when to start taking it again.  This is for your safety. Many medicines and drugs can make you bleed too much during surgery. Some change how well surgery (anesthesia) drugs work.  Call your insurance company  to let them know you're having surgery. (If you don't have insurance, call [phone].)  Call your clinic if there's any change in your health. This includes a scrape or scratch near the surgery site, or any signs of a cold (sore throat, runny nose, cough, rash, fever).  Eating and drinking guidelines  For your safety: Unless your surgeon tells you otherwise, follow the guidelines below.  Eat and drink as normal until 8 hours before you arrive for surgery. After that, no food or milk. You can spit out gum when you arrive.  Drink clear liquids until 2 hours before you arrive. These are liquids you can see through, like water, Gatorade, and Propel Water. They also include plain black coffee and tea (no cream or milk).  No alcohol for 24 hours before you arrive. The night before surgery, stop any drinks that contain THC.  If your care team tells you to take medicine on the morning of surgery, it's okay to take it with a sip of water. No other medicines or drugs are allowed (including CBD oil)--follow your care team's instructions.  If you have questions the day of surgery, call your hospital or surgery center.   Preventing infection  Shower or bathe the night before and the morning of surgery. Follow the instructions your clinic gave you. (If no instructions, use regular soap.)  Don't shave or clip hair near your surgery site. We'll remove the hair if needed.  Don't smoke or vape the morning of surgery. No chewing tobacco for 6 hours before you arrive. A nicotine patch is okay. You may spit out nicotine gum when you arrive.  For some surgeries, the surgeon will tell you to fully quit smoking and nicotine.  We will make every effort to keep you safe from infection. We will:  Clean our hands often with soap and water (or an alcohol-based hand rub).  Clean the skin at your surgery site with a special soap that kills germs.  Give you a special gown to keep you warm. (Cold raises the risk of infection.)  Wear hair covers,  masks, gowns, and gloves during surgery.  Give antibiotic medicine, if prescribed. Not all surgeries need this medicine.  What to bring on the day of surgery  Photo ID and insurance card  Copy of your health care directive, if you have one  Glasses and hearing aids (bring cases)  You can't wear contacts during surgery  Inhaler and eye drops, if you use them (tell us about these when you arrive)  CPAP machine or breathing device, if you use them  A few personal items, if spending the night  If you have . . .  A pacemaker, ICD (cardiac defibrillator), or other implant: Bring the ID card.  An implanted stimulator: Bring the remote control.  A legal guardian: Bring a copy of the certified (court-stamped) guardianship papers.  Please remove any jewelry, including body piercings. Leave jewelry and other valuables at home.  If you're going home the day of surgery  You must have a responsible adult drive you home. They should stay with you overnight as well.  If you don't have someone to stay with you, and you aren't safe to go home alone, we may keep you overnight. Insurance often won't pay for this.  After surgery  If it's hard to control your pain or you need more pain medicine, please call your surgeon's office.  Questions?   If you have any questions for your care team, list them here:   ____________________________________________________________________________________________________________________________________________________________________________________________________________________________________________________________  For informational purposes only. Not to replace the advice of your health care provider. Copyright   2003, 2019 [facility]. All rights reserved. Clinically reviewed by Antoine Smith MD. [employer] [id number] - REV 08/24.

## 2025-03-23 ENCOUNTER — ANESTHESIA EVENT (OUTPATIENT)
Dept: SURGERY | Facility: CLINIC | Age: 40
End: 2025-03-23
Payer: MEDICARE

## 2025-03-24 ENCOUNTER — HOSPITAL ENCOUNTER (OUTPATIENT)
Facility: CLINIC | Age: 40
Discharge: HOME | End: 2025-03-24
Attending: SURGERY | Admitting: SURGERY
Payer: MEDICARE

## 2025-03-24 ENCOUNTER — ANESTHESIA (OUTPATIENT)
Dept: SURGERY | Facility: CLINIC | Age: 40
End: 2025-03-24
Payer: MEDICARE

## 2025-03-24 VITALS
BODY MASS INDEX: 18.24 KG/M2 | TEMPERATURE: 98.6 F | RESPIRATION RATE: 16 BRPM | DIASTOLIC BLOOD PRESSURE: 82 MMHG | HEIGHT: 62 IN | SYSTOLIC BLOOD PRESSURE: 111 MMHG | HEART RATE: 92 BPM | WEIGHT: 99.1 LBS | OXYGEN SATURATION: 100 %

## 2025-03-24 DIAGNOSIS — K40.90 LEFT INGUINAL HERNIA: ICD-10-CM

## 2025-03-24 PROCEDURE — 250N000011 HC RX IP 250 OP 636: Performed by: NURSE ANESTHETIST, CERTIFIED REGISTERED

## 2025-03-24 PROCEDURE — 710N000012 HC RECOVERY PHASE 2, PER MINUTE: Performed by: SURGERY

## 2025-03-24 PROCEDURE — 250N000011 HC RX IP 250 OP 636: Performed by: ANESTHESIOLOGY

## 2025-03-24 PROCEDURE — 258N000003 HC RX IP 258 OP 636: Performed by: NURSE ANESTHETIST, CERTIFIED REGISTERED

## 2025-03-24 PROCEDURE — 360N000080 HC SURGERY LEVEL 7, PER MIN: Performed by: SURGERY

## 2025-03-24 PROCEDURE — 272N000001 HC OR GENERAL SUPPLY STERILE: Performed by: SURGERY

## 2025-03-24 PROCEDURE — 49650 LAP ING HERNIA REPAIR INIT: CPT | Mod: LT | Performed by: SURGERY

## 2025-03-24 PROCEDURE — 250N000025 HC SEVOFLURANE, PER MIN: Performed by: SURGERY

## 2025-03-24 PROCEDURE — 250N000011 HC RX IP 250 OP 636: Performed by: SURGERY

## 2025-03-24 PROCEDURE — C1781 MESH (IMPLANTABLE): HCPCS | Performed by: SURGERY

## 2025-03-24 PROCEDURE — 250N000009 HC RX 250: Performed by: NURSE ANESTHETIST, CERTIFIED REGISTERED

## 2025-03-24 PROCEDURE — 710N000009 HC RECOVERY PHASE 1, LEVEL 1, PER MIN: Performed by: SURGERY

## 2025-03-24 PROCEDURE — 250N000009 HC RX 250: Performed by: SURGERY

## 2025-03-24 PROCEDURE — 49650 LAP ING HERNIA REPAIR INIT: CPT | Mod: AS | Performed by: PHYSICIAN ASSISTANT

## 2025-03-24 PROCEDURE — 370N000017 HC ANESTHESIA TECHNICAL FEE, PER MIN: Performed by: SURGERY

## 2025-03-24 PROCEDURE — 999N000141 HC STATISTIC PRE-PROCEDURE NURSING ASSESSMENT: Performed by: SURGERY

## 2025-03-24 DEVICE — MESH PROGRIP LAPAROSCOPIC 5.9X3.9" PARIETEX SELF-FIX LPG1510: Type: IMPLANTABLE DEVICE | Site: INGUINAL | Status: FUNCTIONAL

## 2025-03-24 RX ORDER — ONDANSETRON 2 MG/ML
INJECTION INTRAMUSCULAR; INTRAVENOUS PRN
Status: DISCONTINUED | OUTPATIENT
Start: 2025-03-24 | End: 2025-03-24

## 2025-03-24 RX ORDER — ONDANSETRON 4 MG/1
4 TABLET, ORALLY DISINTEGRATING ORAL EVERY 30 MIN PRN
Status: DISCONTINUED | OUTPATIENT
Start: 2025-03-24 | End: 2025-03-24 | Stop reason: HOSPADM

## 2025-03-24 RX ORDER — DEXAMETHASONE SODIUM PHOSPHATE 4 MG/ML
4 INJECTION, SOLUTION INTRA-ARTICULAR; INTRALESIONAL; INTRAMUSCULAR; INTRAVENOUS; SOFT TISSUE
Status: DISCONTINUED | OUTPATIENT
Start: 2025-03-24 | End: 2025-03-24 | Stop reason: HOSPADM

## 2025-03-24 RX ORDER — FENTANYL CITRATE 50 UG/ML
INJECTION, SOLUTION INTRAMUSCULAR; INTRAVENOUS PRN
Status: DISCONTINUED | OUTPATIENT
Start: 2025-03-24 | End: 2025-03-24

## 2025-03-24 RX ORDER — SODIUM CHLORIDE, SODIUM LACTATE, POTASSIUM CHLORIDE, CALCIUM CHLORIDE 600; 310; 30; 20 MG/100ML; MG/100ML; MG/100ML; MG/100ML
INJECTION, SOLUTION INTRAVENOUS CONTINUOUS
Status: DISCONTINUED | OUTPATIENT
Start: 2025-03-24 | End: 2025-03-24 | Stop reason: HOSPADM

## 2025-03-24 RX ORDER — ONDANSETRON 4 MG/1
4 TABLET, ORALLY DISINTEGRATING ORAL EVERY 8 HOURS PRN
Qty: 6 TABLET | Refills: 0 | Status: SHIPPED | OUTPATIENT
Start: 2025-03-24

## 2025-03-24 RX ORDER — OXYCODONE HYDROCHLORIDE 5 MG/1
10 TABLET ORAL EVERY 4 HOURS PRN
Status: DISCONTINUED | OUTPATIENT
Start: 2025-03-24 | End: 2025-03-24 | Stop reason: HOSPADM

## 2025-03-24 RX ORDER — FENTANYL CITRATE 50 UG/ML
25 INJECTION, SOLUTION INTRAMUSCULAR; INTRAVENOUS
Status: DISCONTINUED | OUTPATIENT
Start: 2025-03-24 | End: 2025-03-24 | Stop reason: HOSPADM

## 2025-03-24 RX ORDER — DEXAMETHASONE SODIUM PHOSPHATE 4 MG/ML
INJECTION, SOLUTION INTRA-ARTICULAR; INTRALESIONAL; INTRAMUSCULAR; INTRAVENOUS; SOFT TISSUE PRN
Status: DISCONTINUED | OUTPATIENT
Start: 2025-03-24 | End: 2025-03-24

## 2025-03-24 RX ORDER — CEFAZOLIN SODIUM/WATER 2 G/20 ML
2 SYRINGE (ML) INTRAVENOUS
Status: COMPLETED | OUTPATIENT
Start: 2025-03-24 | End: 2025-03-24

## 2025-03-24 RX ORDER — ACETAMINOPHEN 325 MG/1
975 TABLET ORAL
Status: DISCONTINUED | OUTPATIENT
Start: 2025-03-24 | End: 2025-03-24 | Stop reason: HOSPADM

## 2025-03-24 RX ORDER — CEFAZOLIN SODIUM/WATER 2 G/20 ML
2 SYRINGE (ML) INTRAVENOUS SEE ADMIN INSTRUCTIONS
Status: DISCONTINUED | OUTPATIENT
Start: 2025-03-24 | End: 2025-03-24 | Stop reason: HOSPADM

## 2025-03-24 RX ORDER — OXYCODONE HYDROCHLORIDE 5 MG/1
5 TABLET ORAL EVERY 4 HOURS PRN
Status: DISCONTINUED | OUTPATIENT
Start: 2025-03-24 | End: 2025-03-24 | Stop reason: HOSPADM

## 2025-03-24 RX ORDER — LIDOCAINE 40 MG/G
CREAM TOPICAL
Status: DISCONTINUED | OUTPATIENT
Start: 2025-03-24 | End: 2025-03-24 | Stop reason: HOSPADM

## 2025-03-24 RX ORDER — ONDANSETRON 2 MG/ML
4 INJECTION INTRAMUSCULAR; INTRAVENOUS EVERY 30 MIN PRN
Status: DISCONTINUED | OUTPATIENT
Start: 2025-03-24 | End: 2025-03-24 | Stop reason: HOSPADM

## 2025-03-24 RX ORDER — GLYCOPYRROLATE 0.2 MG/ML
INJECTION, SOLUTION INTRAMUSCULAR; INTRAVENOUS PRN
Status: DISCONTINUED | OUTPATIENT
Start: 2025-03-24 | End: 2025-03-24

## 2025-03-24 RX ORDER — BUPIVACAINE HYDROCHLORIDE 5 MG/ML
INJECTION, SOLUTION EPIDURAL; INTRACAUDAL; PERINEURAL PRN
Status: DISCONTINUED | OUTPATIENT
Start: 2025-03-24 | End: 2025-03-24 | Stop reason: HOSPADM

## 2025-03-24 RX ORDER — OXYCODONE HYDROCHLORIDE 5 MG/1
5 TABLET ORAL
Status: DISCONTINUED | OUTPATIENT
Start: 2025-03-24 | End: 2025-03-24 | Stop reason: HOSPADM

## 2025-03-24 RX ORDER — OXYCODONE HYDROCHLORIDE 5 MG/1
5-10 TABLET ORAL EVERY 4 HOURS PRN
Qty: 12 TABLET | Refills: 0 | Status: SHIPPED | OUTPATIENT
Start: 2025-03-24

## 2025-03-24 RX ORDER — HYDROMORPHONE HCL IN WATER/PF 6 MG/30 ML
0.4 PATIENT CONTROLLED ANALGESIA SYRINGE INTRAVENOUS EVERY 5 MIN PRN
Status: DISCONTINUED | OUTPATIENT
Start: 2025-03-24 | End: 2025-03-24 | Stop reason: HOSPADM

## 2025-03-24 RX ORDER — FENTANYL CITRATE 50 UG/ML
50 INJECTION, SOLUTION INTRAMUSCULAR; INTRAVENOUS EVERY 5 MIN PRN
Status: DISCONTINUED | OUTPATIENT
Start: 2025-03-24 | End: 2025-03-24 | Stop reason: HOSPADM

## 2025-03-24 RX ORDER — SODIUM CHLORIDE, SODIUM LACTATE, POTASSIUM CHLORIDE, CALCIUM CHLORIDE 600; 310; 30; 20 MG/100ML; MG/100ML; MG/100ML; MG/100ML
INJECTION, SOLUTION INTRAVENOUS CONTINUOUS PRN
Status: DISCONTINUED | OUTPATIENT
Start: 2025-03-24 | End: 2025-03-24

## 2025-03-24 RX ORDER — LABETALOL HYDROCHLORIDE 5 MG/ML
10 INJECTION, SOLUTION INTRAVENOUS EVERY 10 MIN PRN
Status: DISCONTINUED | OUTPATIENT
Start: 2025-03-24 | End: 2025-03-24 | Stop reason: HOSPADM

## 2025-03-24 RX ORDER — PROPOFOL 10 MG/ML
INJECTION, EMULSION INTRAVENOUS CONTINUOUS PRN
Status: DISCONTINUED | OUTPATIENT
Start: 2025-03-24 | End: 2025-03-24

## 2025-03-24 RX ORDER — MEPERIDINE HYDROCHLORIDE 25 MG/ML
12.5 INJECTION INTRAMUSCULAR; INTRAVENOUS; SUBCUTANEOUS EVERY 5 MIN PRN
Status: DISCONTINUED | OUTPATIENT
Start: 2025-03-24 | End: 2025-03-24 | Stop reason: HOSPADM

## 2025-03-24 RX ORDER — ALBUTEROL SULFATE 0.83 MG/ML
2.5 SOLUTION RESPIRATORY (INHALATION) EVERY 4 HOURS PRN
Status: DISCONTINUED | OUTPATIENT
Start: 2025-03-24 | End: 2025-03-24 | Stop reason: HOSPADM

## 2025-03-24 RX ORDER — KETOROLAC TROMETHAMINE 15 MG/ML
15 INJECTION, SOLUTION INTRAMUSCULAR; INTRAVENOUS ONCE
Status: COMPLETED | OUTPATIENT
Start: 2025-03-24 | End: 2025-03-24

## 2025-03-24 RX ORDER — ACETAMINOPHEN 325 MG/1
975 TABLET ORAL ONCE
Status: COMPLETED | OUTPATIENT
Start: 2025-03-24 | End: 2025-03-24

## 2025-03-24 RX ADMIN — Medication 2 G: at 12:11

## 2025-03-24 RX ADMIN — ROCURONIUM BROMIDE 30 MG: 50 INJECTION, SOLUTION INTRAVENOUS at 12:07

## 2025-03-24 RX ADMIN — SODIUM CHLORIDE, SODIUM LACTATE, POTASSIUM CHLORIDE, AND CALCIUM CHLORIDE: .6; .31; .03; .02 INJECTION, SOLUTION INTRAVENOUS at 12:07

## 2025-03-24 RX ADMIN — PHENYLEPHRINE HYDROCHLORIDE 50 MCG: 10 INJECTION INTRAVENOUS at 12:16

## 2025-03-24 RX ADMIN — PROPOFOL 50 MCG/KG/MIN: 10 INJECTION, EMULSION INTRAVENOUS at 12:27

## 2025-03-24 RX ADMIN — FENTANYL CITRATE 50 MCG: 50 INJECTION INTRAMUSCULAR; INTRAVENOUS at 12:07

## 2025-03-24 RX ADMIN — KETOROLAC TROMETHAMINE 15 MG: 15 INJECTION, SOLUTION INTRAMUSCULAR; INTRAVENOUS at 13:48

## 2025-03-24 RX ADMIN — ONDANSETRON 4 MG: 2 INJECTION INTRAMUSCULAR; INTRAVENOUS at 12:43

## 2025-03-24 RX ADMIN — SUGAMMADEX 100 MG: 100 INJECTION, SOLUTION INTRAVENOUS at 12:54

## 2025-03-24 RX ADMIN — GLYCOPYRROLATE 0.2 MG: 0.2 INJECTION, SOLUTION INTRAMUSCULAR; INTRAVENOUS at 12:10

## 2025-03-24 RX ADMIN — DEXAMETHASONE SODIUM PHOSPHATE 8 MG: 4 INJECTION, SOLUTION INTRA-ARTICULAR; INTRALESIONAL; INTRAMUSCULAR; INTRAVENOUS; SOFT TISSUE at 12:07

## 2025-03-24 ASSESSMENT — ACTIVITIES OF DAILY LIVING (ADL)
ADLS_ACUITY_SCORE: 39
ADLS_ACUITY_SCORE: 40
ADLS_ACUITY_SCORE: 39

## 2025-03-24 NOTE — ANESTHESIA POSTPROCEDURE EVALUATION
Patient: Baljinder Denise    Procedure: Procedure(s):  Robotic assisted left inguinal hernia repair with mesh       Anesthesia Type:  General    Note:  Disposition: Outpatient   Postop Pain Control: Uneventful            Sign Out: Well controlled pain   PONV: No   Neuro/Psych: Uneventful            Sign Out: Acceptable/Baseline neuro status   Airway/Respiratory: Uneventful            Sign Out: Acceptable/Baseline resp. status   CV/Hemodynamics: Uneventful            Sign Out: Acceptable CV status; No obvious hypovolemia; No obvious fluid overload   Other NRE: NONE   DID A NON-ROUTINE EVENT OCCUR? No           Last vitals:  Vitals Value Taken Time   /84 03/24/25 1415   Temp 98.4  F (36.9  C) 03/24/25 1415   Pulse 87 03/24/25 1415   Resp 13 03/24/25 1415   SpO2 96 % 03/24/25 1420   Vitals shown include unfiled device data.    Electronically Signed By: Baljinder Kincaid MD  March 24, 2025  3:06 PM

## 2025-03-24 NOTE — DISCHARGE INSTRUCTIONS
HOME CARE FOLLOWING INGUINAL/FEMORAL HERNIA REPAIR  DEN Lundberg, JOSEP Tolbert, MARGUERITE Soto    DIET:  Start with liquids and gradually resume your regular diet as tolerated.  Drink plenty of fluids.  While taking pain medications, consider use of a stool softener, increase your fiber in your diet, or add a fiber supplement (like Metamucil, Citrucel) to help prevent constipation - a possible side effect of pain medications.    NAUSEA:  If nauseated from the anesthetic/pain meds; rest in bed, get up cautiously with assistance, and drink clear liquids (juice, tea, broth).    ACTIVITY:  Light Activity -- you may immediately be up and about as tolerated.  Walking is encouraged, increase as tolerated.  Driving/Light Work-- when comfortable and off narcotic pain medications.  Strenuous Work/Activity -- limit lifting to 25 pounds for 3 weeks.  Active Sports (running, biking, etc.) -- cautiously resume after 2 weeks.    INCISIONAL CARE:  If you have a dressing in place, keep clean and dry for 48 hours; you may replace the gauze if it becomes soiled.  After 48 hours you may remove the dressing and shower.  Do not submerse incision in water for 1 week.  If you have a Dermabond dressing (a type of skin glue), you may shower immediately.  Sutures will absorb and need not be removed.  If present, leave the steri-strips (white paper tapes) in place for 14 days after surgery.  If present, leave Dermabond glue in place until it wears/flakes off.  Do not apply lotions, creams, or ointments to incisions.  Expect a variable amount of swelling/black and blue discoloration that may involve the penis/scrotum or labia.  Some numbness around the incision is common.  A lump/ridge under the incision is normal and will gradually resolve.    DISCOMFORT:  Local anesthetic placed at surgery should provide relief for 4-8 hours.  Begin taking pain pills before discomfort is severe.  Take the pain medication with  some food, when possible, to minimize side effects.  Intermittent use of ice packs to the hernia repair site may help during the first 1-3 weeks after surgery.  Expect gradual improvement.    Over-the-counter anti-inflammatory medications (i.e. Ibuprofen/Advil/Motrin or Naprosyn/Aleve) may be used per package instructions in addition to or while tapering off the narcotic pain medications to decrease swelling and sensitivity at the repair site.  DO NOT TAKE these Anti-inflammatory medications if your primary physician has advised against doing so, or if you have acid reflux, ulcer, or bleeding disorder, or take blood-thinner medications.  Call your primary physician or the surgery office if you have medication questions.    FOLLOW-UP AFTER SURGERY:  -Our office will contact you approximately 2-3 weeks after surgery to check on your progress and answer any questions you may have.  If you are doing well, you will not need to return for an office appointment.  If any concerns are identified over the phone, we will help you make an appointment to see a provider.    -If you have not received a phone call, have any questions or concerns, or would like to be seen, please call us at [phone].  We are located at: [address], [address]; [place] [address]    -CONTACT US IF THE FOLLOWING DEVELOPS:   1. A fever that is above 101     2. Increased redness, warmth, drainage, bleeding, or swelling.   3. Pain that is not relieved by rest/ice and your prescription.   4.  Increasing pain after 48 hours.   5. Drainage that is thick, cloudy, yellow, green or white.   6. Any other questions or concerns.      FREQUENTLY ASKED QUESTIONS:    Q:  How should my incision look?    A:  Normally your incision will appear slightly swollen with light redness directly along the incision itself as it heals.  It may feel like a bump or ridge as the healing/scarring happens, and over time (3-4 months) this bump or ridge feeling should  slowly go away.  In general, clear or pink watery drainage can be normal at first as your incision heals, but should decrease over time.    Q:  How do I know if my incision is infected?  A:  Look at your incision for signs of infection, like redness around the incision spreading to surrounding skin, or drainage of cloudy or foul-smelling drainage.  If you feel warm, check your temperature to see if you are running a fever.    **If any of these things occur, please notify the nurse at our office.  We may need you to come into the office for an incision check.      Q:  How do I take care of my incision?  A:  If you have a dressing in place - Starting the day after surgery, replace the dressing 1-2 times a day until there is no further drainage from the incision.  At that time, a dressing is no longer needed.  Try to minimize tape on the skin if irritation is occurring at the tape sites.  If you have significant irritation from tape on the skin, please call the office to discuss other method of dressing your incision.    Small pieces of tape called  steri-strips  may be present directly overlying your incision; these may be removed 10 days after surgery unless otherwise specified by your surgeon.  If these tapes start to loosen at the ends, you may trim them back until they fall off or are removed.    A:  If you had  Dermabond  tissue glue used as a dressing (this causes your incision to look shiny with a clear covering over it) - This type of dressing wears off with time and does not require more dressings over the top unless it is draining around the glue as it wears off.  Do not apply ointments or lotions over the incisions until the glue has completely worn off.    Q:  There is a piece of tape or a sticky  lead  still on my skin.  Can I remove this?  A:  Sometimes the sticky  leads  used for monitoring during surgery or for evaluation in the emergency department are not all removed while you are in the hospital.   These sometimes have a tab or metal dot on them.  You can easily remove these on your own, like taking off a band-aid.  If there is a gel substance under the  lead , simply wipe/clean it off with a washcloth or paper towel.      Q:  What can I do to minimize constipation (very hard stools, or lack of stools)?  A:  Stay well hydrated.  Increase your dietary fiber intake or take a fiber supplement -with plenty of water.  Walk around frequently.  You may consider an over-the-counter stool-softener.  Your Pharmacist can assist you with choosing one that is stocked at your pharmacy.  Constipation is also one of the most common side effects of pain medication.  If you are using pain medication, be pro-active and try to PREVENT problems with constipation by taking the steps above BEFORE constipation becomes a problem.    Q:  What do I do if I need more pain medications?  A:  Call the office to receive refills.  Be aware that certain pain meds cannot be called into a pharmacy and actually require a paper prescription.  A change may be made in your pain med as you progress thru your recovery period or if you have side effects to certain meds.    --Pain meds are NOT refilled after 5pm on weekdays, and NOT AT ALL on the weekends, so please look ahead to prevent problems.    Q:  Why am I having a hard time sleeping now that I am at home?  A:  Many medications you receive while you are in the hospital can impact your sleep for a number of days after your surgery/hospitalization.  Decreased level of activity and naps during the day may also make sleeping at night difficult.  Try to minimize day-time naps, and get up frequently during the day to walk around your home during your recovery time.  Sleep aides may be of some help, but are not recommended for long-term use.      Q:  I am having some back discomfort.  What should I do?  A:  This may be related to certain positioning that was required for your surgery, extended periods  of time in bed, or other changes in your overall activity level.  You may try ice, heat, acetaminophen, or ibuprofen to treat this temporarily.  Note that many pain medications have acetaminophen in them and would state this on the prescription bottle.  Be sure not to exceed the maximum of 4000mg per day of acetaminophen.     **If the pain you are having does not resolve, is severe, or is a flare of back pain you have had on other occasions prior to surgery, please contact your primary physician for further recommendations or for an appointment to be examined at their office.    Q:  Why am I having headaches?  A:  Headaches can be caused by many things:  caffeine withdrawal, use of pain meds, dehydration, high blood pressure, lack of sleep, over-activity/exhaustion, flare-up of usual migraine headaches.  If you feel this is related to muscle tension (a band-like feeling around the head, or a pressure at the low-back of the head) you may try ice or heat to this area.  You may need to drink more fluids (try electrolyte drink like Gatorade), rest, or take your usual migraine medications.   **If your headaches do not resolve, worsen, are accompanied by other symptoms, or if your blood pressure is high, please call your primary physician for recommendation and/or examination.    Q:  I am unable to urinate.  What do I do?  A:  A small percentage of people can have difficulty urinating initially after surgery.  This includes being able to urinate only a very small amount at a time and feeling discomfort or pressure in the very low abdomen.  This is called  urinary retention , and is actually an urgent situation.  Proceed to your nearest Emergency department for evaluation (not an Urgent Care Center).  Sometimes the bladder does not work correctly after certain medications you receive during surgery, or related to certain procedures.  You may need to have a catheter placed until your bladder recovers.  When planning to go  to an Emergency department, it may help to call the ER to let them know you are coming in for this problem after a surgery.  This may help you get in quicker to be evaluated.  **If you have symptoms of a urinary tract infection, please contact your primary physician for the proper evaluation and treatment.        If you have other questions, please call the office Monday thru Friday between 8am and 4:30pm to discuss with the nurse or physician assistant.  #[phone]    There is a surgeon ON CALL on weekday evenings and over the weekend in case of urgent need only, and may be contacted at the same number.    If you are having an emergency, call 911 or proceed to your nearest emergency department.         You received Toradol, an IV form of Ibuprofen (Motrin) at 1:45pm.  Do not take any Ibuprofen products until after 7:45pm.    Maximum acetaminophen (Tylenol) dose from all sources should not exceed 4 grams (4000 mg) per day.

## 2025-03-24 NOTE — OP NOTE
General Surgery Operative Note    Pre-operative diagnosis:  Left inguinal hernia [K40.90]   Post-operative diagnosis: same   Procedure: Robotic assisted left inguinal hernia repair with mesh   Surgeon: Aaron Martinez MD   Assistant(s): Sondra Arroyo PA-C - the physician assistant was medically necessary to assist in prepping, positioning, camera operation, retraction/exposure and instrument exchange.   Anesthesia: General    Estimated blood loss: 5 cc's   Drains placed: None   Complications:  None   Findings:  Prominent indirect left inguinal hernia, repaired using preperitoneal ProGrip mesh.     Indications for operation: This is a 39-year-old gentleman with a history of a right inguinal hernia repaired years ago.  He now presents with left inguinal bulging.  Exam revealed an obvious left inguinal hernia.  Robotic assisted repair was recommended.  We discussed the procedure, along with its risks and complications, with the patient and his guardians.  They agreed to proceed.    Details of the operation: After informed consent, the patient was taken to the operating room, where he underwent satisfactory induction of general anesthesia.  The patient was sterilely prepped and draped and a supraumbilical skin incision was made.  Dissection was carried bluntly down to the fascia, which was opened very slightly using electrocautery.  The robotic camera port was inserted and pneumoperitoneum was achieved using CO2 insufflation.  Under direct visualization, 2 robotic 8 mm ports were placed, one on each side of the abdomen.  The patient was placed in slight Trendelenburg position and the robot was brought in and docked without difficulty.  The right groin revealed no evidence of recurrent hernia.  The left side showed an obvious prominent indirect hernia.  The peritoneum was scored above the level of the ASIS on the left.  The preperitoneal space was then developed and the large hernia sac dissected down from the  internal ring.  Once the space was completed, a piece of ProGrip mesh was brought onto the field.  This was trimmed on the corners and deployed in the preperitoneal space so that it lay smoothly.  It was centered over the internal ring.  The peritoneum was now closed over the mesh using a running 3-0 V lock suture.  There was excellent coverage of the mesh.  The robot was now undocked and the pneumoperitoneum was released.  The trocars were removed and the supraumbilical fascia was closed using interrupted 0 Vicryl sutures.  Skin incisions were closed using 4-0 subcuticular Vicryl, followed by Steri-Strips.    The patient tolerated the procedure well and was transferred to the recovery room in satisfactory condition.  Sponge and needle counts were correct at the close of the case.    Specimens: * No specimens in log *        Aaron Martinez MD

## 2025-03-24 NOTE — ANESTHESIA CARE TRANSFER NOTE
Patient: Baljinder Denise    Procedure: Procedure(s):  Robotic assisted left inguinal hernia repair with mesh       Diagnosis: Left inguinal hernia [K40.90]  Diagnosis Additional Information: No value filed.    Anesthesia Type:   General     Note:    Oropharynx: oropharynx clear of all foreign objects and spontaneously breathing  Level of Consciousness: awake  Oxygen Supplementation: room air    Independent Airway: airway patency satisfactory and stable  Dentition: dentition unchanged  Vital Signs Stable: post-procedure vital signs reviewed and stable  Report to RN Given: handoff report given  Patient transferred to: PACU    Handoff Report: Identifed the Patient, Identified the Reponsible Provider, Reviewed the pertinent medical history, Discussed the surgical course, Reviewed Intra-OP anesthesia mangement and issues during anesthesia, Set expectations for post-procedure period and Allowed opportunity for questions and acknowledgement of understanding      Vitals:  Vitals Value Taken Time   /62 03/24/25 1310   Temp 97.7  F (36.5  C) 03/24/25 1313   Pulse 89 03/24/25 1313   Resp 18 03/24/25 1313   SpO2 100 % 03/24/25 1313   Vitals shown include unfiled device data.    Electronically Signed By: TAMMIE Cardenas CRNA  March 24, 2025  1:14 PM

## 2025-03-24 NOTE — ANESTHESIA PROCEDURE NOTES
Airway       Patient location during procedure: OR       Procedure Start/Stop Times: 3/24/2025 12:11 PM  Staff -        CRNA: Terrie Hlul APRN CRNA       Performed By: CRNA  Consent for Airway        Urgency: elective  Indications and Patient Condition       Indications for airway management: laura-procedural       Induction type:inhalational       Mask difficulty assessment: 1 - vent by mask    Final Airway Details       Final airway type: endotracheal airway       Successful airway: ETT - single  Endotracheal Airway Details        ETT size (mm): 7.0       Cuffed: yes       Successful intubation technique: video laryngoscopy       VL Blade Size: Glidescope 3       Grade View of Cords: 1       Adjucts: stylet       Position: Right       Bite block used: Soft    Post intubation assessment        Placement verified by: capnometry        Number of attempts at approach: 1       Secured with: tape       Ease of procedure: easy       Dentition: Intact    Medication(s) Administered   Medication Administration Time: 3/24/2025 12:11 PM

## 2025-03-24 NOTE — ANESTHESIA PREPROCEDURE EVALUATION
"Anesthesia Pre-Procedure Evaluation    Patient: Baljinder Denise   MRN: [id number] : 1985        Procedure : Procedure(s):  Robotic assisted left inguinal hernia repair with mesh          Past Medical History:   Diagnosis Date    Gastro-oesophageal reflux disease     Pneumonia     Hospitalized      Past Surgical History:   Procedure Laterality Date    BIOPSY  09/01/10    for Celiac    HERNIORRHAPHY INGUINAL  2014    Procedure: HERNIORRHAPHY INGUINAL;  Surgeon: Aaron Martinez MD;  Location: [facility] OR      Allergies   Allergen Reactions    Gluten Meal     Dairy Products [Milk Protein]       Social History     Tobacco Use    Smoking status: Never     Passive exposure: Never    Smokeless tobacco: Never   Substance Use Topics    Alcohol use: No      Wt Readings from Last 1 Encounters:   25 45 kg (99 lb 1.6 oz)        Anesthesia Evaluation   Pt has had prior anesthetic. Type: General.    No history of anesthetic complications       ROS/MED HX  ENT/Pulmonary:  - neg pulmonary ROS     Neurologic: Comment: Down syndrome      Cardiovascular:  - neg cardiovascular ROS     METS/Exercise Tolerance:     Hematologic:  - neg hematologic  ROS     Musculoskeletal:  - neg musculoskeletal ROS     GI/Hepatic: Comment: hernia    (+) GERD,                   Renal/Genitourinary:  - neg Renal ROS     Endo:  - neg endo ROS     Psychiatric/Substance Use:  - neg psychiatric ROS     Infectious Disease:  - neg infectious disease ROS     Malignancy:  - neg malignancy ROS     Other:  - neg other ROS          Physical Exam    Airway         TM distance: > 3 FB   Neck ROM: full   Mouth opening: > 3 cm    Respiratory Devices and Support         Dental  no notable dental history         Cardiovascular   cardiovascular exam normal          Pulmonary   pulmonary exam normal                OUTSIDE LABS:  CBC: No results found for: \"WBC\", \"HGB\", \"HCT\", \"PLT\"  BMP:   Lab Results   Component Value Date     2023    NA " "139 05/12/2021    POTASSIUM 4.3 12/01/2023    POTASSIUM 4.2 05/12/2021    CHLORIDE 102 12/01/2023    CHLORIDE 106 05/12/2021    CO2 29 12/01/2023    CO2 30 05/12/2021    BUN 14.0 12/01/2023    BUN 15 05/12/2021    CR 0.75 12/01/2023    CR 0.83 05/12/2021    GLC 88 12/01/2023    GLC 80 05/12/2021     COAGS: No results found for: \"PTT\", \"INR\", \"FIBR\"  POC: No results found for: \"BGM\", \"HCG\", \"HCGS\"  HEPATIC: No results found for: \"ALBUMIN\", \"PROTTOTAL\", \"ALT\", \"AST\", \"GGT\", \"ALKPHOS\", \"BILITOTAL\", \"BILIDIRECT\", \"KATHLEEN\"  OTHER:   Lab Results   Component Value Date    A1C 4.9 01/17/2017    HERBIE 9.2 12/01/2023    MAG 2.0 12/01/2023    TSH 2.29 12/01/2023       Anesthesia Plan    ASA Status:  3    NPO Status:  NPO Appropriate    Anesthesia Type: General.     - Airway: ETT   Induction: Intravenous, Propofol.   Maintenance: Balanced.        Consents    Anesthesia Plan(s) and associated risks, benefits, and realistic alternatives discussed. Questions answered and patient/representative(s) expressed understanding.     - Discussed:     - Discussed with:  Patient            Postoperative Care    Pain management: IV analgesics, Oral pain medications, Multi-modal analgesia.   PONV prophylaxis: Ondansetron (or other 5HT-3), Dexamethasone or Solumedrol     Comments:               Elroy Corcoran MD    Clinically Significant Risk Factors Present on Admission                                          "

## 2025-03-31 ENCOUNTER — HOSPITAL ENCOUNTER (OUTPATIENT)
Dept: CARDIOLOGY | Facility: CLINIC | Age: 40
Discharge: HOME | End: 2025-03-31
Attending: INTERNAL MEDICINE | Admitting: INTERNAL MEDICINE
Payer: MEDICARE

## 2025-03-31 DIAGNOSIS — I49.8 VENTRICULAR BIGEMINY: ICD-10-CM

## 2025-03-31 LAB — LVEF ECHO: NORMAL

## 2025-03-31 PROCEDURE — 93306 TTE W/DOPPLER COMPLETE: CPT

## 2025-03-31 PROCEDURE — 93306 TTE W/DOPPLER COMPLETE: CPT | Mod: 26 | Performed by: INTERNAL MEDICINE

## 2025-04-16 ENCOUNTER — TELEPHONE (OUTPATIENT)
Dept: SURGERY | Facility: CLINIC | Age: 40
End: 2025-04-16
Payer: MEDICARE

## 2025-04-16 NOTE — CONFIDENTIAL NOTE
Attempted to call patient (father/guardian, Carlos) for post op check.  No answer.  Message was left for patient to call back if they had any questions of concerns.     Sondra Arroyo PA-C

## 2025-05-07 ENCOUNTER — RESULTS FOLLOW-UP (OUTPATIENT)
Dept: CARDIOLOGY | Facility: CLINIC | Age: 40
End: 2025-05-07

## 2025-05-11 ENCOUNTER — HEALTH MAINTENANCE LETTER (OUTPATIENT)
Age: 40
End: 2025-05-11

## 2025-07-17 ENCOUNTER — OFFICE VISIT (OUTPATIENT)
Dept: CARDIOLOGY | Facility: CLINIC | Age: 40
End: 2025-07-17
Payer: COMMERCIAL

## 2025-07-17 VITALS
SYSTOLIC BLOOD PRESSURE: 102 MMHG | WEIGHT: 100.1 LBS | DIASTOLIC BLOOD PRESSURE: 68 MMHG | OXYGEN SATURATION: 99 % | HEIGHT: 62 IN | BODY MASS INDEX: 18.42 KG/M2 | HEART RATE: 66 BPM

## 2025-07-17 DIAGNOSIS — I49.8 VENTRICULAR BIGEMINY: Primary | ICD-10-CM

## 2025-07-17 NOTE — PROGRESS NOTES
HPI and Plan:   I had the pleasure of seeing Faheem Denise in cardiology clinic for follow up of PVCs.      Faheem is a pleasant 39-year-old male.  He has trisomy 21 and has some mental limitations.  He is accompanied by his father.  He was seen by me last year for PVCs.  He had a heart monitor in 2023 which revealed 20% burden of PVCs.  Subsequently January 20, 2041 Romanic/treadmill test which was then modified Familia protocol for 8 minutes without ischemia or arrhythmia.  In addition, echocardiogram revealed normal ejection fraction.  His PVCs were thought to be of RVOT origin but because he was minimally symptomatic, it was managed conservatively.    He had a repeat echocardiogram in March which again revealed normal ejection fraction with no valvular issues.  I reviewed the results with him and his father.    He reports being physically active, walking daily and exercising. No other symptoms or concerns were reported.  He is a poor historian and his father contributes to the history.    Physical Exam  - CARDIOVASCULAR: Regular rate and rhythm, no murmurs, no arrhythmia detected during auscultation.  - LUNGS: Lungs clear to auscultation.    Results  - Echocardiogram from March 2025: Normal ejection fraction, no valve issue, good heart function.  - Heart monitor from last year: Showed PVCs with a 20% burden.  - Exercise treadmill test from January 2024: Performed well on modified Familia protocol at 18 minutes, no ischemia, no arrhythmia with exercise.      Assessment & Plan  Premature ventricular contractions (PVCs):  - The patient has a history of PVCs, confirmed by a heart monitor last year showing a 20% burden. Cardiac evaluation included an echocardiogram in March 2025, which demonstrated normal heart function with normal ejection fraction and no valve issues.  Excess treadmill revealed no worsening of arrhythmia with exercise.  At this time, PVCs are benign and likely RVOT origin.  Since he is asymptomatic, we  "will continue present conservative management.  If he is symptomatic in future, he can come back and see us.  For now I will discharge him from the clinic with regular follow-up with PMD.  Patient's father voices understanding.    Sincerely,    Nathan Kumar MD          Orders this Visit:  No orders of the defined types were placed in this encounter.    No orders of the defined types were placed in this encounter.    There are no discontinued medications.      No diagnosis found.    CURRENT MEDICATIONS:  Current Outpatient Medications   Medication Sig Dispense Refill    ascorbic acid (VITAMIN C) 250 MG CHEW chewable tablet Take 250 mg by mouth daily      Pediatric Multiple Vit-C-FA (MULTIVITAMIN CHILDRENS) CHEW       ondansetron (ZOFRAN ODT) 4 MG ODT tab Take 1 tablet (4 mg) by mouth every 8 hours as needed for nausea. (Patient not taking: Reported on 7/17/2025) 6 tablet 0    oxyCODONE (ROXICODONE) 5 MG tablet Take 1-2 tablets (5-10 mg) by mouth every 4 hours as needed for moderate to severe pain. (Patient not taking: Reported on 7/17/2025) 12 tablet 0       ALLERGIES     Allergies   Allergen Reactions    Gluten Meal     Dairy Products [Milk Protein]        PAST MEDICAL, SURGICAL, FAMILY, SOCIAL HISTORY:  History was reviewed and updated as needed, see medical record.    Review of Systems:  A 12-point review of systems was completed, see medical record for detailed review of systems information.    Physical Exam:  Vitals: /68   Pulse 66   Ht 1.575 m (5' 2\")   Wt 45.4 kg (100 lb 1.6 oz)   SpO2 99%   BMI 18.31 kg/m            Recent Lab Results:  LIPID RESULTS:  Lab Results   Component Value Date    CHOL 135 05/12/2021    HDL 41 05/12/2021    LDL 76 05/12/2021    TRIG 89 05/12/2021       LIVER ENZYME RESULTS:  No results found for: \"AST\", \"ALT\"    CBC RESULTS:  No results found for: \"WBC\", \"RBC\", \"HGB\", \"HCT\", \"MCV\", \"MCH\", \"MCHC\", \"RDW\", \"PLT\"    BMP RESULTS:  Lab Results   Component Value Date     " "12/01/2023     05/12/2021    POTASSIUM 4.3 12/01/2023    POTASSIUM 4.2 05/12/2021    CHLORIDE 102 12/01/2023    CHLORIDE 106 05/12/2021    CO2 29 12/01/2023    CO2 30 05/12/2021    ANIONGAP 8 12/01/2023    ANIONGAP 2 (L) 05/12/2021    GLC 88 12/01/2023    GLC 80 05/12/2021    BUN 14.0 12/01/2023    BUN 15 05/12/2021    CR 0.75 12/01/2023    CR 0.83 05/12/2021    GFRESTIMATED >90 12/01/2023    GFRESTIMATED >90 05/12/2021    GFRESTBLACK >90 05/12/2021    HERBIE 9.2 12/01/2023    HERBIE 8.7 05/12/2021        A1C RESULTS:  Lab Results   Component Value Date    A1C 4.9 01/17/2017       INR RESULTS:  No results found for: \"INR\"        CC  Nathan Kumar MD  [address]  [address]  [place] [id number]                  "

## 2025-07-17 NOTE — LETTER
7/17/2025    Surya Javier MD  [address] [place] [address]    RE: Baljinder YOLA Denise       Dear Colleague,     I had the pleasure of seeing Baljinder Denise in the [facility].  HPI and Plan:   I had the pleasure of seeing aFheem Denise in cardiology clinic for follow up of PVCs.      Faheem is a pleasant 39-year-old male.  He has trisomy 21 and has some mental limitations.  He is accompanied by his father.  He was seen by me last year for PVCs.  He had a heart monitor in 2023 which revealed 20% burden of PVCs.  Subsequently January 20, 2041 Romanic/treadmill test which was then modified Familia protocol for 8 minutes without ischemia or arrhythmia.  In addition, echocardiogram revealed normal ejection fraction.  His PVCs were thought to be of RVOT origin but because he was minimally symptomatic, it was managed conservatively.    He had a repeat echocardiogram in March which again revealed normal ejection fraction with no valvular issues.  I reviewed the results with him and his father.    He reports being physically active, walking daily and exercising. No other symptoms or concerns were reported.  He is a poor historian and his father contributes to the history.    Physical Exam  - CARDIOVASCULAR: Regular rate and rhythm, no murmurs, no arrhythmia detected during auscultation.  - LUNGS: Lungs clear to auscultation.    Results  - Echocardiogram from March 2025: Normal ejection fraction, no valve issue, good heart function.  - Heart monitor from last year: Showed PVCs with a 20% burden.  - Exercise treadmill test from January 2024: Performed well on modified Familia protocol at 18 minutes, no ischemia, no arrhythmia with exercise.      Assessment & Plan  Premature ventricular contractions (PVCs):  - The patient has a history of PVCs, confirmed by a heart monitor last year showing a 20% burden. Cardiac evaluation included an echocardiogram in March 2025, which demonstrated normal  "heart function with normal ejection fraction and no valve issues.  Excess treadmill revealed no worsening of arrhythmia with exercise.  At this time, PVCs are benign and likely RVOT origin.  Since he is asymptomatic, we will continue present conservative management.  If he is symptomatic in future, he can come back and see us.  For now I will discharge him from the clinic with regular follow-up with PMD.  Patient's father voices understanding.    Sincerely,    Nathan Kumar MD          Orders this Visit:  No orders of the defined types were placed in this encounter.    No orders of the defined types were placed in this encounter.    There are no discontinued medications.      No diagnosis found.    CURRENT MEDICATIONS:  Current Outpatient Medications   Medication Sig Dispense Refill     ascorbic acid (VITAMIN C) 250 MG CHEW chewable tablet Take 250 mg by mouth daily       Pediatric Multiple Vit-C-FA (MULTIVITAMIN CHILDRENS) CHEW        ondansetron (ZOFRAN ODT) 4 MG ODT tab Take 1 tablet (4 mg) by mouth every 8 hours as needed for nausea. (Patient not taking: Reported on 7/17/2025) 6 tablet 0     oxyCODONE (ROXICODONE) 5 MG tablet Take 1-2 tablets (5-10 mg) by mouth every 4 hours as needed for moderate to severe pain. (Patient not taking: Reported on 7/17/2025) 12 tablet 0       ALLERGIES     Allergies   Allergen Reactions     Gluten Meal      Dairy Products [Milk Protein]        PAST MEDICAL, SURGICAL, FAMILY, SOCIAL HISTORY:  History was reviewed and updated as needed, see medical record.    Review of Systems:  A 12-point review of systems was completed, see medical record for detailed review of systems information.    Physical Exam:  Vitals: /68   Pulse 66   Ht 1.575 m (5' 2\")   Wt 45.4 kg (100 lb 1.6 oz)   SpO2 99%   BMI 18.31 kg/m            Recent Lab Results:  LIPID RESULTS:  Lab Results   Component Value Date    CHOL 135 05/12/2021    HDL 41 05/12/2021    LDL 76 05/12/2021    TRIG 89 05/12/2021 " "      LIVER ENZYME RESULTS:  No results found for: \"AST\", \"ALT\"    CBC RESULTS:  No results found for: \"WBC\", \"RBC\", \"HGB\", \"HCT\", \"MCV\", \"MCH\", \"MCHC\", \"RDW\", \"PLT\"    BMP RESULTS:  Lab Results   Component Value Date     12/01/2023     05/12/2021    POTASSIUM 4.3 12/01/2023    POTASSIUM 4.2 05/12/2021    CHLORIDE 102 12/01/2023    CHLORIDE 106 05/12/2021    CO2 29 12/01/2023    CO2 30 05/12/2021    ANIONGAP 8 12/01/2023    ANIONGAP 2 (L) 05/12/2021    GLC 88 12/01/2023    GLC 80 05/12/2021    BUN 14.0 12/01/2023    BUN 15 05/12/2021    CR 0.75 12/01/2023    CR 0.83 05/12/2021    GFRESTIMATED >90 12/01/2023    GFRESTIMATED >90 05/12/2021    GFRESTBLACK >90 05/12/2021    HERBIE 9.2 12/01/2023    HERBIE 8.7 05/12/2021        A1C RESULTS:  Lab Results   Component Value Date    A1C 4.9 01/17/2017       INR RESULTS:  No results found for: \"INR\"        CC  Nathan Kumar MD  [address]  [address]  [place]  [place] [address]                    Thank you for allowing me to participate in the care of your patient.      Sincerely,     Nathan Kumar MD     [facility]  cc:   Nathan Kumar MD  [address]  [address]  [place]  [place] [address]      "

## 2025-08-02 ENCOUNTER — TELEPHONE (OUTPATIENT)
Dept: PEDIATRICS | Facility: CLINIC | Age: 40
End: 2025-08-02
Payer: COMMERCIAL

## (undated) DEVICE — DAVINCI XI DRAPE ARM 470015

## (undated) DEVICE — DRAPE MAYO STAND 23X54 8337

## (undated) DEVICE — SU WND CLOSURE VLOC 90 ABS 3-0 VIOLET 6" CV-23 VLOCM0804

## (undated) DEVICE — ESU GROUND PAD ADULT W/CORD E7507

## (undated) DEVICE — DAVINCI XI SEAL UNIVERSAL 5-12MM 470500

## (undated) DEVICE — DAVINCI XI OBTURATOR BLADELESS 8MM 470359

## (undated) DEVICE — SUTURE VICRYL+ 0 CT-2 18" VCP727H

## (undated) DEVICE — SU VICRYL 4-0 PS-2 18" UND J496H

## (undated) DEVICE — DAVINCI XI DRAPE COLUMN 470341

## (undated) DEVICE — LUBRICANT INST ELECTROLUBE EL101

## (undated) DEVICE — DAVINCI HOT SHEARS TIP COVER  400180

## (undated) DEVICE — LINEN ORTHO ACL PACK 5447

## (undated) DEVICE — VIAL DECANTER STERILE WHITE DYNJDEC06

## (undated) DEVICE — SOLUTION IV WATER 1000ML R5000-01

## (undated) DEVICE — Device

## (undated) DEVICE — GLOVE BIOGEL PI MICRO SZ 7.5 48575

## (undated) DEVICE — GLOVE BIOGEL PI MICRO SZ 8.0 48580

## (undated) DEVICE — ESU ELEC BLADE 2.75" COATED/INSULATED E1455

## (undated) DEVICE — ESU PENCIL W/HOLSTER E2350H

## (undated) DEVICE — SU STRATAFIX PDS PLUS 3-0 SPIRAL SH 15CM SXPP1B420

## (undated) RX ORDER — FENTANYL CITRATE-0.9 % NACL/PF 10 MCG/ML
PLASTIC BAG, INJECTION (ML) INTRAVENOUS
Status: DISPENSED
Start: 2025-03-24

## (undated) RX ORDER — KETOROLAC TROMETHAMINE 15 MG/ML
INJECTION, SOLUTION INTRAMUSCULAR; INTRAVENOUS
Status: DISPENSED
Start: 2025-03-24

## (undated) RX ORDER — PROPOFOL 10 MG/ML
INJECTION, EMULSION INTRAVENOUS
Status: DISPENSED
Start: 2025-03-24

## (undated) RX ORDER — DEXAMETHASONE SODIUM PHOSPHATE 4 MG/ML
INJECTION, SOLUTION INTRA-ARTICULAR; INTRALESIONAL; INTRAMUSCULAR; INTRAVENOUS; SOFT TISSUE
Status: DISPENSED
Start: 2025-03-24

## (undated) RX ORDER — ONDANSETRON 2 MG/ML
INJECTION INTRAMUSCULAR; INTRAVENOUS
Status: DISPENSED
Start: 2025-03-24

## (undated) RX ORDER — CEFAZOLIN SODIUM/WATER 2 G/20 ML
SYRINGE (ML) INTRAVENOUS
Status: DISPENSED
Start: 2025-03-24

## (undated) RX ORDER — FENTANYL CITRATE 50 UG/ML
INJECTION, SOLUTION INTRAMUSCULAR; INTRAVENOUS
Status: DISPENSED
Start: 2025-03-24

## (undated) RX ORDER — BUPIVACAINE HYDROCHLORIDE AND EPINEPHRINE 5; 5 MG/ML; UG/ML
INJECTION, SOLUTION EPIDURAL; INTRACAUDAL; PERINEURAL
Status: DISPENSED
Start: 2025-03-24

## (undated) RX ORDER — BUPIVACAINE HYDROCHLORIDE 5 MG/ML
INJECTION, SOLUTION EPIDURAL; INTRACAUDAL; PERINEURAL
Status: DISPENSED
Start: 2025-03-24